# Patient Record
Sex: FEMALE | Race: BLACK OR AFRICAN AMERICAN | NOT HISPANIC OR LATINO | Employment: FULL TIME | RURAL
[De-identification: names, ages, dates, MRNs, and addresses within clinical notes are randomized per-mention and may not be internally consistent; named-entity substitution may affect disease eponyms.]

---
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
## 2021-12-27 ENCOUNTER — HOSPITAL ENCOUNTER (EMERGENCY)
Age: 21
Discharge: HOME | End: 2021-12-27
Payer: COMMERCIAL

## 2021-12-27 VITALS
DIASTOLIC BLOOD PRESSURE: 87 MMHG | TEMPERATURE: 99.14 F | OXYGEN SATURATION: 100 % | SYSTOLIC BLOOD PRESSURE: 140 MMHG | HEART RATE: 97 BPM | RESPIRATION RATE: 21 BRPM

## 2021-12-27 VITALS
RESPIRATION RATE: 21 BRPM | OXYGEN SATURATION: 100 % | HEART RATE: 97 BPM | TEMPERATURE: 99.14 F | DIASTOLIC BLOOD PRESSURE: 87 MMHG | SYSTOLIC BLOOD PRESSURE: 140 MMHG

## 2021-12-27 VITALS — BODY MASS INDEX: 19.6 KG/M2

## 2021-12-27 DIAGNOSIS — U07.1: Primary | ICD-10-CM

## 2021-12-27 PROCEDURE — C9803 HOPD COVID-19 SPEC COLLECT: HCPCS

## 2021-12-27 PROCEDURE — U0003 INFECTIOUS AGENT DETECTION BY NUCLEIC ACID (DNA OR RNA); SEVERE ACUTE RESPIRATORY SYNDROME CORONAVIRUS 2 (SARS-COV-2) (CORONAVIRUS DISEASE [COVID-19]), AMPLIFIED PROBE TECHNIQUE, MAKING USE OF HIGH THROUGHPUT TECHNOLOGIES AS DESCRIBED BY CMS-2020-01-R: HCPCS

## 2021-12-27 PROCEDURE — 99203 OFFICE O/P NEW LOW 30 MIN: CPT

## 2021-12-27 PROCEDURE — U0005 INFEC AGEN DETEC AMPLI PROBE: HCPCS

## 2021-12-27 PROCEDURE — 87804 INFLUENZA ASSAY W/OPTIC: CPT

## 2021-12-27 PROCEDURE — G0463 HOSPITAL OUTPT CLINIC VISIT: HCPCS

## 2021-12-27 PROCEDURE — 87880 STREP A ASSAY W/OPTIC: CPT

## 2022-10-04 ENCOUNTER — HOSPITAL ENCOUNTER (EMERGENCY)
Age: 22
Discharge: HOME | End: 2022-10-04
Payer: COMMERCIAL

## 2022-10-04 VITALS
HEART RATE: 74 BPM | TEMPERATURE: 98.42 F | RESPIRATION RATE: 16 BRPM | OXYGEN SATURATION: 99 % | SYSTOLIC BLOOD PRESSURE: 147 MMHG | DIASTOLIC BLOOD PRESSURE: 74 MMHG

## 2022-10-04 VITALS
HEART RATE: 74 BPM | SYSTOLIC BLOOD PRESSURE: 147 MMHG | RESPIRATION RATE: 16 BRPM | TEMPERATURE: 98.5 F | DIASTOLIC BLOOD PRESSURE: 74 MMHG

## 2022-10-04 VITALS — BODY MASS INDEX: 21.9 KG/M2

## 2022-10-04 DIAGNOSIS — J02.0: Primary | ICD-10-CM

## 2022-10-04 PROCEDURE — 99212 OFFICE O/P EST SF 10 MIN: CPT

## 2022-10-04 PROCEDURE — G0463 HOSPITAL OUTPT CLINIC VISIT: HCPCS

## 2022-10-04 PROCEDURE — 87880 STREP A ASSAY W/OPTIC: CPT

## 2022-12-07 ENCOUNTER — HOSPITAL ENCOUNTER (EMERGENCY)
Age: 22
Discharge: HOME | End: 2022-12-07
Payer: COMMERCIAL

## 2022-12-07 VITALS
RESPIRATION RATE: 18 BRPM | OXYGEN SATURATION: 100 % | SYSTOLIC BLOOD PRESSURE: 124 MMHG | TEMPERATURE: 97.9 F | HEART RATE: 96 BPM | DIASTOLIC BLOOD PRESSURE: 76 MMHG

## 2022-12-07 VITALS
SYSTOLIC BLOOD PRESSURE: 124 MMHG | OXYGEN SATURATION: 100 % | TEMPERATURE: 97.9 F | RESPIRATION RATE: 17 BRPM | DIASTOLIC BLOOD PRESSURE: 76 MMHG | HEART RATE: 96 BPM

## 2022-12-07 VITALS — BODY MASS INDEX: 21.9 KG/M2

## 2022-12-07 DIAGNOSIS — J06.9: Primary | ICD-10-CM

## 2022-12-07 PROCEDURE — 87880 STREP A ASSAY W/OPTIC: CPT

## 2022-12-07 PROCEDURE — G0463 HOSPITAL OUTPT CLINIC VISIT: HCPCS

## 2022-12-07 PROCEDURE — 99212 OFFICE O/P EST SF 10 MIN: CPT

## 2023-02-07 ENCOUNTER — OFFICE VISIT (OUTPATIENT)
Dept: OBSTETRICS AND GYNECOLOGY | Facility: CLINIC | Age: 23
End: 2023-02-07
Payer: COMMERCIAL

## 2023-02-07 VITALS
HEIGHT: 67 IN | WEIGHT: 158 LBS | SYSTOLIC BLOOD PRESSURE: 122 MMHG | DIASTOLIC BLOOD PRESSURE: 82 MMHG | BODY MASS INDEX: 24.8 KG/M2

## 2023-02-07 DIAGNOSIS — O02.0 BLIGHTED OVUM: Primary | ICD-10-CM

## 2023-02-07 PROBLEM — Z34.00 PREGNANCY, FIRST: Status: ACTIVE | Noted: 2023-02-07

## 2023-02-07 PROCEDURE — 99203 OFFICE O/P NEW LOW 30 MIN: CPT | Performed by: OBSTETRICS & GYNECOLOGY

## 2023-02-07 NOTE — PROGRESS NOTES
"    Chief Complaint   Patient presents with   • Threatened Miscarriage          HPI  Elaina Pena is a 22 y.o. female, , who presents with cramping and U/S with gestational sac only.    Recent Tests:  She had a positive urine pregnancy test on 2023. She did take a few preg test on 23 and 23.     US today: Yes.  Findings showed gestational sac measuring 5 weeks 5 days.  Yolk sac seen but fetal pole missing..  I have personally evaluated the U/S and agree with the findings.   She has not had prenatal care.  She complains of cramping pain.  The pain is located in her suprapubic region.. Her past medical history is non-contributory.  She does not have passage of tissue.  Rh Status: Unknown  She also complains of nausea and back pain.    The additional following portions of the patient's history were reviewed and updated as appropriate: allergies, current medications, past family history, past medical history, past social history, past surgical history and problem list.    Review of Systems   Constitutional: Negative.    HENT: Negative.    Eyes: Negative.    Respiratory: Negative.    Cardiovascular: Negative.    Gastrointestinal: Negative.    Endocrine: Negative.    Genitourinary: Negative.    Musculoskeletal: Negative.    Skin: Negative.    Allergic/Immunologic: Negative.    Neurological: Negative.    Hematological: Negative.    Psychiatric/Behavioral: Negative.      All other systems reviewed and are negative.     I have reviewed and agree with the HPI, ROS, and historical information as entered above. Anita Flanagan MD    Objective   /82   Ht 168.9 cm (66.5\")   Wt 71.7 kg (158 lb)   LMP 2022 (Approximate)   BMI 25.12 kg/m²     Physical Exam  Vitals and nursing note reviewed.   Constitutional:       Appearance: Normal appearance. She is well-developed.   HENT:      Head: Normocephalic and atraumatic.   Pulmonary:      Effort: Pulmonary effort is normal.      Breath sounds: " Normal breath sounds.   Abdominal:      General: There is no distension.      Palpations: Abdomen is soft. Abdomen is not rigid. There is no mass.      Tenderness: There is no abdominal tenderness. There is no guarding or rebound.   Musculoskeletal:      Cervical back: Normal range of motion.   Skin:     General: Skin is warm and dry.   Neurological:      Mental Status: She is alert and oriented to person, place, and time.   Psychiatric:         Mood and Affect: Mood normal.         Behavior: Behavior normal.            Assessment and Plan    Problem List Items Addressed This Visit        Gravid and     Blighted ovum - Primary    Overview     2023-ultrasound shows gestational sac with yolk sac only measuring 5 weeks 5 days.  Unsure if this is early gestation or blighted ovum.  Plan to draw labs today.  Repeat hCG in 2 days.  Have patient return to clinic in 2 weeks for an ultrasound to assess viability.         Relevant Orders    US Ob Transvaginal    ABO / Rh    HCG, B-subunit, Quantitative       1. Early gestation versus blighted ovum.  2. Repeat U/S in 2 week(s).  3. Call for an increase in bleeding, abdominal pain, or fever.  Return in about 2 weeks (around 2023) for ultrasound.    Counseling was given to patient for the following topics: diagnostic results, instructions for management, prognosis and impressions . Total time of the encounter was 25 minutes and 20 minutes was spend counseling.      Anita Flanagan MD  2023

## 2023-02-08 ENCOUNTER — TELEPHONE (OUTPATIENT)
Dept: OBSTETRICS AND GYNECOLOGY | Facility: CLINIC | Age: 23
End: 2023-02-08
Payer: COMMERCIAL

## 2023-02-08 LAB
ABO GROUP BLD: NORMAL
HCG INTACT+B SERPL-ACNC: NORMAL MIU/ML
RH BLD: POSITIVE

## 2023-02-08 NOTE — TELEPHONE ENCOUNTER
----- Message from Anita Flanagan MD sent at 2/8/2023  7:39 AM EST -----  Please help patient sign up for my chart.  Let her know that her blood type is O+.  Her hCG level looks great and we will repeat it tomorrow as planned.

## 2023-02-09 ENCOUNTER — LAB (OUTPATIENT)
Dept: OBSTETRICS AND GYNECOLOGY | Facility: CLINIC | Age: 23
End: 2023-02-09
Payer: COMMERCIAL

## 2023-02-09 DIAGNOSIS — O20.9 FIRST TRIMESTER BLEEDING: Primary | ICD-10-CM

## 2023-02-10 LAB — HCG INTACT+B SERPL-ACNC: NORMAL MIU/ML

## 2023-02-27 ENCOUNTER — INITIAL PRENATAL (OUTPATIENT)
Dept: OBSTETRICS AND GYNECOLOGY | Facility: CLINIC | Age: 23
End: 2023-02-27
Payer: COMMERCIAL

## 2023-02-27 VITALS — BODY MASS INDEX: 24.74 KG/M2 | DIASTOLIC BLOOD PRESSURE: 78 MMHG | WEIGHT: 155.6 LBS | SYSTOLIC BLOOD PRESSURE: 116 MMHG

## 2023-02-27 DIAGNOSIS — Z34.90 PRENATAL CARE, ANTEPARTUM: Primary | ICD-10-CM

## 2023-02-27 PROBLEM — Z34.00 PREGNANCY, FIRST: Status: RESOLVED | Noted: 2023-02-07 | Resolved: 2023-02-27

## 2023-02-27 PROBLEM — O02.0 BLIGHTED OVUM: Status: RESOLVED | Noted: 2023-02-07 | Resolved: 2023-02-27

## 2023-02-27 PROCEDURE — 0501F PRENATAL FLOW SHEET: CPT | Performed by: OBSTETRICS & GYNECOLOGY

## 2023-02-27 NOTE — PROGRESS NOTES
Initial ob visit     CC- Here for care of pregnancy        Elaina Pena is a 22 y.o. female, , who presents for her first obstetrical visit.  Her last LMP was Patient's last menstrual period was 2022.. Her HORACIO is 10/7/2023, by Ultrasound. Current GA is 8w2d.     Initial positive test date : 23, UPT        Her periods are: every 4 weeks  Prior obstetric issues: none  Patient's past medical history is significant for: none.  Family history of genetic issues (includes FOB): none  Prior infections concerning in pregnancy (Rash, fever in last 2 weeks): No  Varicella Hx - vaccinated  Prior testing for Cystic Fibrosis Carrier or Sickle Cell Trait- none  Prepregnancy BMI - Body mass index is 24.74 kg/m².  History of STD: no  Hx of HSV for patient or partner: no  US done today: Yes.  Findings showed single intrauterine pregnancy.  Measuring 8 weeks 2 days today with fetal heart rate of 167..  I have personally evaluated the U/S and agree with the findings.     OB History    Para Term  AB Living   1             SAB IAB Ectopic Molar Multiple Live Births                    # Outcome Date GA Lbr Arvind/2nd Weight Sex Delivery Anes PTL Lv   1 Current                Additional Pertinent History   Last Pap : never      Last Completed Pap Smear     This patient has no relevant Health Maintenance data.        History of abnormal Pap smear: no  Family history of uterine, colon, breast, or ovarian cancer: no  Feelings of Anxiety or Depression: no  Tobacco Usage?: Yes Elaina Pena  reports that she has never smoked. She states she vapes nicotine everyday. I have educated her on the risk of diseases from using tobacco products such as cancer, COPD, heart disease, reproductive problems and low birth weight.     I advised her to quit and she is willing to quit. We have discussed the following method/s for tobacco cessation:  Education Material Counseling.  Together we have set a quit date for 1 month from  today.  She will follow up with me in 5 weeks or sooner to check on her progress.    I spent 5 minutes counseling the patient.        Alcohol/Drug Use?: YES Drug: occasional/rare use  Over the age of 35 at delivery: no  Desires Genetic Screening: undecided  Flu Status: Already given in current flu season    PMH  No current outpatient medications on file.     History reviewed. No pertinent past medical history.     Past Surgical History:   Procedure Laterality Date   • WISDOM TOOTH EXTRACTION         Review of Systems   Review of Systems  Patient Reports: Nausea and vomiting and Fatigue  Patient Denies: Spotting, Heavy bleeding and Cramping  All systems reviewed and otherwise normal.    I have reviewed and agree with the HPI, ROS, and historical information as entered above. Anita Flanagan MD    /78   Wt 70.6 kg (155 lb 9.6 oz)   LMP 12/28/2022   BMI 24.74 kg/m²     The additional following portions of the patient's history were reviewed and updated as appropriate: allergies, current medications, past family history, past medical history, past social history, past surgical history and problem list.    Physical Exam  General:  well developed; well nourished  no acute distress   Chest/Respiratory: No labored breathing, normal respiratory effort, normal appearance, no respiratory noises noted   Heart:  normal rate, regular rhythm,  no murmurs, rubs, or gallops   Thyroid: normal to inspection and palpation   Breasts:  Not performed.   Abdomen: soft, non-tender; no masses  no umbilical or inguinal hernias are present  no hepato-splenomegaly   Pelvis: Exam limited by  body habitus  Clinical staff was present for exam  External genitalia:  normal appearance of the external genitalia including Bartholin's and [place]'s glands.  :  urethral meatus normal;  Vaginal:  normal pink mucosa without prolapse or lesions.  Cervix:  normal appearance.  Uterus:  normal size, shape and consistency.  Adnexa:  normal  bimanual exam of the adnexa.        Assessment and Plan    Problem List Items Addressed This Visit        Gravid and     Prenatal care, antepartum - Primary    Relevant Orders    Obstetric Panel    Chlamydia trachomatis, Neisseria gonorrhoeae, PCR w/ confirmation - Urine, Urine, Clean Catch    Urinalysis With Microscopic - Urine, Clean Catch    HIV-1 / O / 2 Ag / Antibody 4th Generation    Urine Culture - Urine, Urine, Clean Catch    Urine Drug Screen - Urine, Clean Catch    LIQUID-BASED PAP SMEAR, [facility] ([facility])       1. Pregnancy at 8w2d  2. Reviewed routine prenatal care with the office and educational materials given  3. Lab(s) Ordered  4. Discussed options for genetic testing including first trimester nuchal translucency screen, genetic disease carrier testing, quadruple screen, and NIPT  5. Discontinue the use of all non-medicinal drugs and chemicals  6. Nausea/Vomiting - she does not desire medications at this time.  Discussed conservative ways to help with nausea.  7. Patient is on Prenatal vitamins  Return in about 4 weeks (around 3/27/2023).      Anita Flanagan MD  2023

## 2023-03-01 PROBLEM — F12.10 MILD TETRAHYDROCANNABINOL (THC) ABUSE: Status: ACTIVE | Noted: 2023-03-01

## 2023-03-01 LAB
ABO GROUP BLD: NORMAL
AMPHETAMINES UR QL SCN: NEGATIVE NG/ML
APPEARANCE UR: CLEAR
BACTERIA #/AREA URNS HPF: NORMAL /[HPF]
BACTERIA UR CULT: NORMAL
BACTERIA UR CULT: NORMAL
BARBITURATES UR QL SCN: NEGATIVE NG/ML
BASOPHILS # BLD AUTO: 0 X10E3/UL (ref 0–0.2)
BASOPHILS NFR BLD AUTO: 0 %
BENZODIAZ UR QL SCN: NEGATIVE NG/ML
BILIRUB UR QL STRIP: NEGATIVE
BLD GP AB SCN SERPL QL: NEGATIVE
BZE UR QL SCN: NEGATIVE NG/ML
C TRACH RRNA SPEC QL NAA+PROBE: NEGATIVE
CANNABINOIDS UR QL SCN: POSITIVE NG/ML
CASTS URNS QL MICRO: NORMAL /LPF
COLOR UR: YELLOW
CREAT UR-MCNC: 221.3 MG/DL (ref 20–300)
EOSINOPHIL # BLD AUTO: 0.1 X10E3/UL (ref 0–0.4)
EOSINOPHIL NFR BLD AUTO: 1 %
EPI CELLS #/AREA URNS HPF: NORMAL /HPF (ref 0–10)
ERYTHROCYTE [DISTWIDTH] IN BLOOD BY AUTOMATED COUNT: 12 % (ref 11.7–15.4)
GLUCOSE UR QL STRIP: NEGATIVE
HBV SURFACE AG SERPL QL IA: NEGATIVE
HCT VFR BLD AUTO: 39.9 % (ref 34–46.6)
HCV IGG SERPL QL IA: NON REACTIVE
HGB BLD-MCNC: 13.2 G/DL (ref 11.1–15.9)
HGB UR QL STRIP: NEGATIVE
HIV 1+2 AB+HIV1 P24 AG SERPL QL IA: NON REACTIVE
IMM GRANULOCYTES # BLD AUTO: 0 X10E3/UL (ref 0–0.1)
IMM GRANULOCYTES NFR BLD AUTO: 0 %
KETONES UR QL STRIP: NEGATIVE
LABORATORY COMMENT REPORT: ABNORMAL
LEUKOCYTE ESTERASE UR QL STRIP: NEGATIVE
LYMPHOCYTES # BLD AUTO: 2.4 X10E3/UL (ref 0.7–3.1)
LYMPHOCYTES NFR BLD AUTO: 26 %
Lab: NORMAL
MCH RBC QN AUTO: 27.2 PG (ref 26.6–33)
MCHC RBC AUTO-ENTMCNC: 33.1 G/DL (ref 31.5–35.7)
MCV RBC AUTO: 82 FL (ref 79–97)
METHADONE UR QL SCN: NEGATIVE NG/ML
MICRO URNS: NORMAL
MICRO URNS: NORMAL
MONOCYTES # BLD AUTO: 0.8 X10E3/UL (ref 0.1–0.9)
MONOCYTES NFR BLD AUTO: 9 %
N GONORRHOEA RRNA SPEC QL NAA+PROBE: NEGATIVE
NEUTROPHILS # BLD AUTO: 5.8 X10E3/UL (ref 1.4–7)
NEUTROPHILS NFR BLD AUTO: 64 %
NITRITE UR QL STRIP: NEGATIVE
OPIATES UR QL SCN: NEGATIVE NG/ML
OXYCODONE+OXYMORPHONE UR QL SCN: NEGATIVE NG/ML
PCP UR QL: NEGATIVE NG/ML
PH UR STRIP: 6 [PH] (ref 5–7.5)
PH UR: 5.8 [PH] (ref 4.5–8.9)
PLATELET # BLD AUTO: 228 X10E3/UL (ref 150–450)
PROPOXYPH UR QL SCN: NEGATIVE NG/ML
PROT UR QL STRIP: NEGATIVE
RBC # BLD AUTO: 4.85 X10E6/UL (ref 3.77–5.28)
RBC #/AREA URNS HPF: NORMAL /HPF (ref 0–2)
RH BLD: POSITIVE
RPR SER QL: NON REACTIVE
RUBV IGG SERPL IA-ACNC: 3.07 INDEX
SP GR UR STRIP: 1.02 (ref 1–1.03)
UROBILINOGEN UR STRIP-MCNC: 1 MG/DL (ref 0.2–1)
WBC # BLD AUTO: 9.2 X10E3/UL (ref 3.4–10.8)
WBC #/AREA URNS HPF: NORMAL /HPF (ref 0–5)

## 2023-03-03 LAB — REF LAB TEST METHOD: NORMAL

## 2023-03-05 PROBLEM — Z87.42 HISTORY OF ABNORMAL CERVICAL PAP SMEAR: Status: ACTIVE | Noted: 2023-03-05

## 2023-03-27 ENCOUNTER — ROUTINE PRENATAL (OUTPATIENT)
Dept: OBSTETRICS AND GYNECOLOGY | Facility: CLINIC | Age: 23
End: 2023-03-27
Payer: COMMERCIAL

## 2023-03-27 VITALS — WEIGHT: 152.9 LBS | DIASTOLIC BLOOD PRESSURE: 70 MMHG | BODY MASS INDEX: 24.31 KG/M2 | SYSTOLIC BLOOD PRESSURE: 128 MMHG

## 2023-03-27 DIAGNOSIS — Z34.90 PRENATAL CARE, ANTEPARTUM: Primary | ICD-10-CM

## 2023-03-27 DIAGNOSIS — F12.10 MILD TETRAHYDROCANNABINOL (THC) ABUSE: ICD-10-CM

## 2023-03-27 LAB
GLUCOSE UR STRIP-MCNC: NEGATIVE MG/DL
PROT UR STRIP-MCNC: NEGATIVE MG/DL

## 2023-03-27 RX ORDER — PRENATAL VIT NO.126/IRON/FOLIC 28MG-0.8MG
TABLET ORAL DAILY
COMMUNITY

## 2023-03-27 RX ORDER — DIPHENHYDRAMINE HYDROCHLORIDE 25 MG/1
25 CAPSULE ORAL DAILY
COMMUNITY

## 2023-03-27 NOTE — PROGRESS NOTES
OB FOLLOW UP  CC- Here for care of pregnancy        Elaina Pena is a 22 y.o.  12w2d patient being seen today for her obstetrical follow up visit. Patient reports no complaints..     Her prenatal care is complicated by (and status) :   Patient Active Problem List   Diagnosis   • Prenatal care, antepartum   • Mild tetrahydrocannabinol (THC) abuse   • History of abnormal cervical Pap smear       Desires genetic testing?: Yes with Gender  Ultrasound Today: No    ROS -   Patient Reports : No Problems  Patient Denies: Loss of Fluid, Vaginal Spotting, Vision Changes, Headaches, Nausea  and Vomiting   Fetal Movement : absent  All other systems reviewed and are negative.     The additional following portions of the patient's history were reviewed and updated as appropriate: allergies, current medications, past family history, past medical history, past social history, past surgical history and problem list.    I have reviewed and agree with the HPI, ROS, and historical information as entered above. Anita Flanagan MD    /70   Wt 69.4 kg (152 lb 14.4 oz)   LMP 2022   BMI 24.31 kg/m²         EXAM:     Prenatal Vitals  BP: 128/70  Weight: 69.4 kg (152 lb 14.4 oz)   Fetal Heart Rate: 163          Urine Glucose Read-only: Negative  Urine Protein Read-only: Negative       Assessment and Plan    Problem List Items Addressed This Visit        Gravid and     Prenatal care, antepartum - Primary    Relevant Orders    POC Urinalysis Dipstick (Completed)    [id number] PLUS Core+SCA+ESS - Blood,       Mental Health    Mild tetrahydrocannabinol (THC) abuse    Overview     Positive at new OB visit on 2023-recommend repeat at 20 weeks.            1. Pregnancy at 12w2d  2. Labs reviewed from New OB Visit.  3. Counseled on genetic testing, carrier status and option for NT screen  4. Activity and Exercise discussed.  5. Patient is on Prenatal vitamins  6. Genetic testing today.  Return in about  4 weeks (around 4/24/2023).    Anita Flanagan MD  03/27/2023

## 2023-04-27 ENCOUNTER — ROUTINE PRENATAL (OUTPATIENT)
Dept: OBSTETRICS AND GYNECOLOGY | Facility: CLINIC | Age: 23
End: 2023-04-27
Payer: COMMERCIAL

## 2023-04-27 VITALS — WEIGHT: 158.2 LBS | SYSTOLIC BLOOD PRESSURE: 112 MMHG | DIASTOLIC BLOOD PRESSURE: 70 MMHG | BODY MASS INDEX: 25.15 KG/M2

## 2023-04-27 DIAGNOSIS — F12.10 MILD TETRAHYDROCANNABINOL (THC) ABUSE: ICD-10-CM

## 2023-04-27 DIAGNOSIS — Z34.90 PRENATAL CARE, ANTEPARTUM: Primary | ICD-10-CM

## 2023-04-27 LAB
GLUCOSE UR STRIP-MCNC: NEGATIVE MG/DL
PROT UR STRIP-MCNC: NEGATIVE MG/DL

## 2023-04-27 NOTE — PROGRESS NOTES
OB FOLLOW UP  CC- Here for care of pregnancy        Elaina Pena is a 22 y.o.  16w5d patient being seen today for her obstetrical follow up visit. Patient reports nausea and vomiting 4-5 times a week. Pt states unisom and vitamin b6 does help.     Her prenatal care is complicated by (and status) :   Patient Active Problem List   Diagnosis   • Prenatal care, antepartum   • Mild tetrahydrocannabinol (THC) abuse   • History of abnormal cervical Pap smear         Ultrasound Today: No    AFP: declines    ROS -   Patient Denies: Loss of Fluid, Vaginal Spotting, Vision Changes, Headaches, Contractions and Epigastric pain  Fetal Movement : absent  All other systems reviewed and are negative.       The additional following portions of the patient's history were reviewed and updated as appropriate: allergies and current medications.    I have reviewed and agree with the HPI, ROS, and historical information as entered above. Anita Flanagan MD        EXAM:     Prenatal Vitals  BP: 112/70  Weight: 71.8 kg (158 lb 3.2 oz)   Fetal Heart Rate: 150   Pelvic Exam:        Urine Glucose Read-only: Negative  Urine Protein Read-only: Negative           Assessment and Plan    Problem List Items Addressed This Visit        Gravid and     Prenatal care, antepartum - Primary    Overview     cfDNA negative.  Boy!         Relevant Orders    POC Urinalysis Dipstick (Completed)    US Ob 14 + Weeks Single or First Gestation       Mental Health    Mild tetrahydrocannabinol (THC) abuse    Overview     Positive at new OB visit on 2023-recommend repeat at 20 weeks.            1. Pregnancy at 16w5d  2. Fetal status reassuring.   3. Counseled on MSAFP alone in relation to OTD and placental issues.  Patient declines testing  4. Anatomy scan next visit.   5. Activity and Exercise discussed.  6. Patient is on Prenatal vitamins  Return in about 4 weeks (around 2023) for anatomy usg.    Anita Flanagan  MD  04/27/2023

## 2023-05-22 ENCOUNTER — ROUTINE PRENATAL (OUTPATIENT)
Dept: OBSTETRICS AND GYNECOLOGY | Facility: CLINIC | Age: 23
End: 2023-05-22
Payer: COMMERCIAL

## 2023-05-22 VITALS — BODY MASS INDEX: 26.01 KG/M2 | DIASTOLIC BLOOD PRESSURE: 70 MMHG | WEIGHT: 163.6 LBS | SYSTOLIC BLOOD PRESSURE: 128 MMHG

## 2023-05-22 DIAGNOSIS — Z34.90 PRENATAL CARE, ANTEPARTUM: Primary | ICD-10-CM

## 2023-05-22 DIAGNOSIS — F12.10 MILD TETRAHYDROCANNABINOL (THC) ABUSE: ICD-10-CM

## 2023-05-22 LAB
EXPIRATION DATE: 0
GLUCOSE UR STRIP-MCNC: ABNORMAL MG/DL
Lab: 0
PROT UR STRIP-MCNC: NEGATIVE MG/DL

## 2023-05-22 NOTE — PROGRESS NOTES
OB FOLLOW UP  CC- Here for care of pregnancy        Elaina Pena is a 22 y.o.  20w2d patient being seen today for her obstetrical follow up visit. Patient reports vomiting yesterday and the day before after eating some fish. No other complaints. Patient states she had a Big Red and very sweet coffee this AM.    Her prenatal care is complicated by (and status):  Patient Active Problem List   Diagnosis   • Prenatal care, antepartum   • Mild tetrahydrocannabinol (THC) abuse   • History of abnormal cervical Pap smear     US done today: Yes.  Findings showed Male fetus in cephalic presentation fetal heart rate of 155.  Placenta is posterior and fundal.  Umbilical cord is three-vessel and normal fluid volume noted.  Estimated fetal weight 13 ounces with normal growth.  Suboptimal for visualization of cranial anatomy secondary to positioning.  Heart seen today and appears normal.  No fetal anomaly seen.  Cervical length 40 mm..  I have personally evaluated the U/S and agree with the findings. Anita Flanagan MD      ROS -   Patient Reports : see above  Patient Denies: Loss of Fluid, Vaginal Spotting, Vision Changes, Headaches, Contractions and Epigastric pain  Fetal Movement : normal  All other systems reviewed and are negative.       The additional following portions of the patient's history were reviewed and updated as appropriate: allergies, current medications, past family history, past medical history, past social history, past surgical history and problem list.    I have reviewed and agree with the HPI, ROS, and historical information as entered above. Anita Flanagan MD    /70   Wt 74.2 kg (163 lb 9.6 oz)   LMP 2022   BMI 26.01 kg/m²       EXAM:     Prenatal Vitals  BP: 128/70  Weight: 74.2 kg (163 lb 9.6 oz)   Fetal Heart Rate: 155          Urine Glucose Read-only: (!) 1+  Urine Protein Read-only: Negative       Assessment and Plan    Problem List Items Addressed This Visit         Gravid and     Prenatal care, antepartum - Primary    Overview     cfDNA negative.  Boy!         Relevant Orders    Urine Drug Screen - Urine, Clean Catch    POC Glucose, Urine, Qualitative, Dipstick (Completed)    POC Protein, Urine, Qualitative, Dipstick (Completed)    US Ob Follow Up Transabdominal Approach       Mental Health    Mild tetrahydrocannabinol (THC) abuse    Overview     Positive at new OB visit on 2023-recommend repeat at 20 weeks.         Relevant Orders    Urine Drug Screen - Urine, Clean Catch       1. Pregnancy at 20w2d  2. Discussed glucosuria need to watch carbohydrate control.  3. Anatomy scan today is incomplete, follow up in 4 weeks for additional views. Anatomy that was visualized was within normal limits.  4. Fetal status reassuring.   5. Activity and Exercise discussed.  6. Patient is on Prenatal vitamins  Return in about 4 weeks (around 2023) for ultrasound.    Anita Flanagan MD  2023

## 2023-05-23 LAB
AMPHETAMINES UR QL SCN: NEGATIVE NG/ML
BARBITURATES UR QL SCN: NEGATIVE NG/ML
BENZODIAZ UR QL SCN: NEGATIVE NG/ML
BZE UR QL SCN: NEGATIVE NG/ML
CANNABINOIDS UR QL SCN: POSITIVE NG/ML
CREAT UR-MCNC: 210.1 MG/DL (ref 20–300)
LABORATORY COMMENT REPORT: ABNORMAL
METHADONE UR QL SCN: NEGATIVE NG/ML
OPIATES UR QL SCN: NEGATIVE NG/ML
OXYCODONE+OXYMORPHONE UR QL SCN: NEGATIVE NG/ML
PCP UR QL: NEGATIVE NG/ML
PH UR: 5.5 [PH] (ref 4.5–8.9)
PROPOXYPH UR QL SCN: NEGATIVE NG/ML

## 2023-06-19 ENCOUNTER — ROUTINE PRENATAL (OUTPATIENT)
Dept: OBSTETRICS AND GYNECOLOGY | Facility: CLINIC | Age: 23
End: 2023-06-19
Payer: COMMERCIAL

## 2023-06-19 VITALS — BODY MASS INDEX: 27.63 KG/M2 | SYSTOLIC BLOOD PRESSURE: 112 MMHG | DIASTOLIC BLOOD PRESSURE: 70 MMHG | WEIGHT: 173.8 LBS

## 2023-06-19 DIAGNOSIS — O36.5930 POOR FETAL GROWTH AFFECTING MANAGEMENT OF MOTHER IN THIRD TRIMESTER, SINGLE OR UNSPECIFIED FETUS: ICD-10-CM

## 2023-06-19 DIAGNOSIS — F12.10 MILD TETRAHYDROCANNABINOL (THC) ABUSE: ICD-10-CM

## 2023-06-19 DIAGNOSIS — Z34.90 PRENATAL CARE, ANTEPARTUM: Primary | ICD-10-CM

## 2023-06-19 DIAGNOSIS — O35.09X0 CEREBRAL VENTRICULOMEGALY, FETAL, AFFECTING CARE OF MOTHER: ICD-10-CM

## 2023-06-19 LAB
GLUCOSE UR STRIP-MCNC: NEGATIVE MG/DL
PROT UR STRIP-MCNC: NEGATIVE MG/DL

## 2023-06-19 PROCEDURE — 0502F SUBSEQUENT PRENATAL CARE: CPT | Performed by: NURSE PRACTITIONER

## 2023-06-19 NOTE — PROGRESS NOTES
OB FOLLOW UP  CC- Here for care of pregnancy        Elaina Pena is a 22 y.o.  24w2d patient being seen today for her obstetrical follow up visit. Patient reports no complaints.     Her prenatal care is complicated by (and status) :   Patient Active Problem List   Diagnosis    Prenatal care, antepartum    Mild tetrahydrocannabinol (THC) abuse    History of abnormal cervical Pap smear    Cerebral ventriculomegaly, fetal, affecting care of mother    Poor fetal growth affecting management of mother in third trimester       US done today: Yes for additional anatomy views of brain, spine, nose/lips.  Findings showed right lateral ventriculomegaly approx 11 mm. All other structures visualized appear normal. EFW 28%, AC 4%, HC 9%. Breech, Normal AF.  I have reviewed the preliminary US report. Final report pending physician review. . YAN Sanabria     ROS -   Patient Reports : No Problems  Patient Denies: Loss of Fluid, Vaginal Spotting, Vision Changes, Headaches, Nausea , Vomiting , Contractions, and Epigastric pain  Fetal Movement : normal  All other systems reviewed and are negative.       The additional following portions of the patient's history were reviewed and updated as appropriate: allergies, current medications, past family history, past medical history, past social history, past surgical history, and problem list.      I have reviewed and agree with the HPI, ROS, and historical information as entered above. YAN Sanabria      /70   Wt 78.8 kg (173 lb 12.8 oz)   LMP 2022   BMI 27.63 kg/m²       EXAM:     Prenatal Vitals  BP: 112/70  Weight: 78.8 kg (173 lb 12.8 oz)   Fetal Heart Rate: 149               Urine Glucose Read-only: Negative  Urine Protein Read-only: Negative       Assessment and Plan    Problem List Items Addressed This Visit       Prenatal care, antepartum - Primary    Overview     cfDNA negative.  Boy!         Relevant Orders    POC Urinalysis  Dipstick (Completed)    Mild tetrahydrocannabinol (THC) abuse    Overview     Positive at new OB visit on 2023  Positive on 23  Repeat at 28 week visit         Cerebral ventriculomegaly, fetal, affecting care of mother    Current Assessment & Plan     23-Right lateral ventriculomegaly 11 mm  CfDNA negative, No AFP done          Relevant Orders    [facility] [place]    Poor fetal growth affecting management of mother in third trimester    Overview     23-AC 4%, HC 9%, EFW 28%         Current Assessment & Plan     23-AC 4%, HC 9%, EFW 28%          Relevant Orders    [facility]       Pregnancy at 24w2d  Fetal status reassuring. Pt reports adequate fetal movement.  Anatomy scan repeated today for additional views. Right lateral ventriculomegaly approx 11 mm. AC 4%, HC 9%, EFW 28%.  1 hour gtt, CBC, Antibody screen, and TDAP next visit. Instructions given  Fetal movement/PTL or Labor precautions  Reviewed routine prenatal care with the office and educational materials given  Referral to PDC Ordered  Discussed/encouraged TDAP vaccination after 28 weeks  Reviewed UDS and risks during pregnancy; will repeat monthly  Activity and Exercise discussed.  Return in about 4 weeks (around 2023).  Dr. Benavides recommends FU within 1-2 weeks at PDC  Plan repeat UDS next visit    Sol Jackson, YAN  2023

## 2023-07-27 ENCOUNTER — OFFICE VISIT (OUTPATIENT)
Dept: OBSTETRICS AND GYNECOLOGY | Facility: HOSPITAL | Age: 23
End: 2023-07-27
Payer: COMMERCIAL

## 2023-07-27 ENCOUNTER — HOSPITAL ENCOUNTER (OUTPATIENT)
Dept: WOMENS IMAGING | Facility: HOSPITAL | Age: 23
Discharge: HOME | End: 2023-07-27
Admitting: OBSTETRICS & GYNECOLOGY
Payer: COMMERCIAL

## 2023-07-27 ENCOUNTER — ROUTINE PRENATAL (OUTPATIENT)
Dept: OBSTETRICS AND GYNECOLOGY | Facility: CLINIC | Age: 23
End: 2023-07-27
Payer: COMMERCIAL

## 2023-07-27 VITALS — DIASTOLIC BLOOD PRESSURE: 70 MMHG | BODY MASS INDEX: 28.78 KG/M2 | WEIGHT: 181 LBS | SYSTOLIC BLOOD PRESSURE: 114 MMHG

## 2023-07-27 VITALS — DIASTOLIC BLOOD PRESSURE: 76 MMHG | SYSTOLIC BLOOD PRESSURE: 124 MMHG | BODY MASS INDEX: 28.87 KG/M2 | WEIGHT: 181.6 LBS

## 2023-07-27 DIAGNOSIS — O99.019 MATERNAL ANEMIA IN PREGNANCY, ANTEPARTUM: ICD-10-CM

## 2023-07-27 DIAGNOSIS — O36.5930 POOR FETAL GROWTH AFFECTING MANAGEMENT OF MOTHER IN THIRD TRIMESTER, SINGLE OR UNSPECIFIED FETUS: ICD-10-CM

## 2023-07-27 DIAGNOSIS — O35.09X0 CEREBRAL VENTRICULOMEGALY, FETAL, AFFECTING CARE OF MOTHER: Primary | ICD-10-CM

## 2023-07-27 DIAGNOSIS — O35.09X0 CEREBRAL VENTRICULOMEGALY, FETAL, AFFECTING CARE OF MOTHER: ICD-10-CM

## 2023-07-27 DIAGNOSIS — Z34.90 PRENATAL CARE, ANTEPARTUM: Primary | ICD-10-CM

## 2023-07-27 DIAGNOSIS — F12.10 MILD TETRAHYDROCANNABINOL (THC) ABUSE: ICD-10-CM

## 2023-07-27 DIAGNOSIS — Z34.90 PREGNANCY, UNSPECIFIED GESTATIONAL AGE: ICD-10-CM

## 2023-07-27 LAB
GLUCOSE UR STRIP-MCNC: NEGATIVE MG/DL
PROT UR STRIP-MCNC: NEGATIVE MG/DL

## 2023-07-27 PROCEDURE — 76819 FETAL BIOPHYS PROFIL W/O NST: CPT

## 2023-07-27 PROCEDURE — 76816 OB US FOLLOW-UP PER FETUS: CPT

## 2023-07-27 PROCEDURE — 76820 UMBILICAL ARTERY ECHO: CPT

## 2023-07-27 NOTE — PROGRESS NOTES
OB FOLLOW UP  CC- Here for care of pregnancy        Elaina Pena is a 22 y.o.  29w5d patient being seen today for her obstetrical follow up visit. Patient reports no complaints..     Her prenatal care is complicated by (and status) :    Patient Active Problem List   Diagnosis    Prenatal care, antepartum    Mild tetrahydrocannabinol (THC) abuse    History of abnormal cervical Pap smear    Cerebral ventriculomegaly, fetal, affecting care of mother    Poor fetal growth affecting management of mother in third trimester    Maternal anemia in pregnancy, antepartum         TDAP status: received at last visit  Rhogam status: was not indicated  28 week labs: Reviewed and Labs show anemia. She is taking additional iron supplement.  Ultrasound Today: Yes at PDC. Report states mild but improved AC lag at 7th%, lateral ventricles appear wnl. Recommended growth and dopplers in 3 weeks. With continued AC lag recommended weekly  testing until next growth scan 23.    Non Stress Test: No. BPP at PDC       ROS -   Patient Reports : No Problems  Patient Denies: Loss of Fluid, Vaginal Spotting, Vision Changes, Headaches, and Contractions  Fetal Movement : normal  All other systems reviewed and are negative.       The additional following portions of the patient's history were reviewed and updated as appropriate: allergies, current medications, past family history, past medical history, past social history, past surgical history, and problem list.    I have reviewed and agree with the HPI, ROS, and historical information as entered above. Anita Flanagan MD      /70   Wt 82.1 kg (181 lb)   LMP 2022   BMI 28.78 kg/m²         EXAM:     Prenatal Vitals  BP: 114/70  Weight: 82.1 kg (181 lb)   Fetal Heart Rate: 159bpm               Urine Glucose Read-only: Negative  Urine Protein Read-only: Negative           Assessment and Plan    Problem List Items Addressed This Visit          Gravid and      Prenatal care, antepartum - Primary    Overview     cfDNA negative.  Boy!         Relevant Orders    POC Urinalysis Dipstick (Completed)    Cerebral ventriculomegaly, fetal, affecting care of mother    Overview     Seen by PDC at 26 weeks.  Reevaluation on  shows normal lateral ventricles          Poor fetal growth affecting management of mother in third trimester    Overview     23-AC 4%, HC 9%, EFW 28%  2023-PDC evaluation showed AC-F 3rd percentile and normal growth.     PDC: Mild but improved AC lag currently 7th%, lateral ventricles appear wnl. With continued AC lag recommended weekly  testing until next growth scan 23             Hematology and Neoplasia    Maternal anemia in pregnancy, antepartum    Overview     11.2            Mental Health    Mild tetrahydrocannabinol (THC) abuse    Overview     Positive at new OB visit on 2023  Positive on 23  Repeat at 28 week visit-positive            Pregnancy at 29w5d  Fetal status reassuring.  BPP 8 out of 8.  Ventriculomegaly resolved.  AC 7th percentile which is somewhat improved from 3rd percentile at 25 weeks.  Per PDC recommendation, will have weekly NSTs until growth ultrasound which is scheduled on .  28 week labs reviewed.    Activity and Exercise discussed.  Fetal movement/PTL or Labor precautions  Return in about 1 week (around 8/3/2023) for nst.    Anita Flanagan MD  2023

## 2023-08-02 ENCOUNTER — ROUTINE PRENATAL (OUTPATIENT)
Dept: OBSTETRICS AND GYNECOLOGY | Facility: CLINIC | Age: 23
End: 2023-08-02
Payer: COMMERCIAL

## 2023-08-02 VITALS — DIASTOLIC BLOOD PRESSURE: 78 MMHG | WEIGHT: 182.8 LBS | BODY MASS INDEX: 29.06 KG/M2 | SYSTOLIC BLOOD PRESSURE: 118 MMHG

## 2023-08-02 DIAGNOSIS — O36.5930 POOR FETAL GROWTH AFFECTING MANAGEMENT OF MOTHER IN THIRD TRIMESTER, SINGLE OR UNSPECIFIED FETUS: Primary | ICD-10-CM

## 2023-08-02 DIAGNOSIS — Z34.90 PRENATAL CARE, ANTEPARTUM: ICD-10-CM

## 2023-08-02 DIAGNOSIS — F12.10 MILD TETRAHYDROCANNABINOL (THC) ABUSE: ICD-10-CM

## 2023-08-02 DIAGNOSIS — O99.019 MATERNAL ANEMIA IN PREGNANCY, ANTEPARTUM: ICD-10-CM

## 2023-08-02 LAB
GLUCOSE UR STRIP-MCNC: NEGATIVE MG/DL
PROT UR STRIP-MCNC: NEGATIVE MG/DL

## 2023-08-10 ENCOUNTER — ROUTINE PRENATAL (OUTPATIENT)
Dept: OBSTETRICS AND GYNECOLOGY | Facility: CLINIC | Age: 23
End: 2023-08-10
Payer: COMMERCIAL

## 2023-08-10 VITALS — SYSTOLIC BLOOD PRESSURE: 122 MMHG | DIASTOLIC BLOOD PRESSURE: 76 MMHG | WEIGHT: 184.4 LBS | BODY MASS INDEX: 29.32 KG/M2

## 2023-08-10 DIAGNOSIS — F12.10 MILD TETRAHYDROCANNABINOL (THC) ABUSE: ICD-10-CM

## 2023-08-10 DIAGNOSIS — O36.5930 POOR FETAL GROWTH AFFECTING MANAGEMENT OF MOTHER IN THIRD TRIMESTER, SINGLE OR UNSPECIFIED FETUS: ICD-10-CM

## 2023-08-10 DIAGNOSIS — Z34.90 PRENATAL CARE, ANTEPARTUM: Primary | ICD-10-CM

## 2023-08-10 DIAGNOSIS — O99.019 MATERNAL ANEMIA IN PREGNANCY, ANTEPARTUM: ICD-10-CM

## 2023-08-10 LAB
GLUCOSE UR STRIP-MCNC: NEGATIVE MG/DL
PROT UR STRIP-MCNC: NEGATIVE MG/DL

## 2023-08-10 NOTE — PROGRESS NOTES
OB FOLLOW UP  CC- Here for care of pregnancy        Elaina Pena is a 23 y.o.  31w5d patient being seen today for her obstetrical follow up visit. Patient reports no complaints today.     Her prenatal care is complicated by (and status) :    Patient Active Problem List   Diagnosis    Prenatal care, antepartum    Mild tetrahydrocannabinol (THC) abuse    History of abnormal cervical Pap smear    Poor fetal growth affecting management of mother in third trimester    Maternal anemia in pregnancy, antepartum       TDAP status: declines  Rhogam status: was not indicated  28 week labs: Reviewed and Labs show anemia. She is taking additional iron supplement.  Ultrasound Today: No  Non Stress Test: Yes minutes 20- category 1  non-stress test: NST: Reactive  indication: Fetal Growth Restriction (IUGR)      ROS -   Patient Denies: Loss of Fluid, Vaginal Spotting, Vision Changes, Headaches, Nausea , Vomiting , Contractions, and Epigastric pain  Fetal Movement : normal  All other systems reviewed and are negative.       The additional following portions of the patient's history were reviewed and updated as appropriate: allergies, current medications, past family history, past medical history, past social history, past surgical history, and problem list.    I have reviewed and agree with the HPI, ROS, and historical information as entered above. Anita Flanagan MD      /76   Wt 83.6 kg (184 lb 6.4 oz)   LMP 2022   BMI 29.32 kg/mý         EXAM:     Prenatal Vitals  BP: 122/76  Weight: 83.6 kg (184 lb 6.4 oz)   Fetal Heart Rate: 135               Urine Glucose Read-only: Negative  Urine Protein Read-only: Negative           Assessment and Plan    Problem List Items Addressed This Visit          Gravid and     Prenatal care, antepartum - Primary    Overview     cfDNA negative.  Boy!         Relevant Orders    POC Urinalysis Dipstick (Completed)    Poor fetal growth affecting management of  mother in third trimester    Overview     23-AC 4%, HC 9%, EFW 28%  2023-PDC evaluation showed AC-F 3rd percentile and normal growth.     PDC: Mild but improved AC lag currently 7th%, lateral ventricles appear wnl. With continued AC lag recommended weekly  testing until next growth scan 23             Hematology and Neoplasia    Maternal anemia in pregnancy, antepartum    Overview     11.2         Relevant Medications    Ferrous Sulfate (IRON PO)       Mental Health    Mild tetrahydrocannabinol (THC) abuse    Overview     Positive at new OB visit on 2023  Positive on 23  Repeat at 28 week visit-positive            Pregnancy at 31w5d  Fetal status reassuring.  28 week labs reviewed.    Activity and Exercise discussed.  Fetal movement/PTL or Labor precautions  Return in about 1 week (around 2023) for nst.    Anita Flanagan MD  08/10/2023

## 2023-08-14 ENCOUNTER — TELEPHONE (OUTPATIENT)
Dept: OBSTETRICS AND GYNECOLOGY | Facility: CLINIC | Age: 23
End: 2023-08-14
Payer: COMMERCIAL

## 2023-08-14 ENCOUNTER — TELEPHONE (OUTPATIENT)
Dept: OBSTETRICS AND GYNECOLOGY | Facility: CLINIC | Age: 23
End: 2023-08-14

## 2023-08-14 NOTE — TELEPHONE ENCOUNTER
Caller: Elaina Pena    Relationship: Self    Best call back number: [phone]    What form or medical record are you requesting: LA PAPERWORK    Who is requesting this form or medical record from you: PT'S WORK    How would you like to receive the form or medical records (pick-up, mail, fax): FAX  If fax, what is the fax number: [phone]    Timeframe paperwork needed: ASAP    Additional notes: PT STATED THAT ALE CALLED AND SAID HER LA PAPERWORK WAS COMPLETED - PT WOULD LIKE TO HAVE THIS FAXED TO HER WORK AT THE ABOVE NUMBER

## 2023-08-14 NOTE — TELEPHONE ENCOUNTER
VISH COMPLETED AND SCANNED INTO CHART. CALLED PT TO INFORM HER IT WAS COMPLETED AND GET A NUMBER TO FAX IT TO. LVM FOR PT TO RETURN CALL IF SHE NEEDED IT FAXED SOMEWHERE OR SHE CAN PICK IT UP AT HER NEXT APPOINTMENT

## 2023-08-18 ENCOUNTER — OFFICE VISIT (OUTPATIENT)
Dept: OBSTETRICS AND GYNECOLOGY | Facility: HOSPITAL | Age: 23
End: 2023-08-18
Payer: COMMERCIAL

## 2023-08-18 ENCOUNTER — ROUTINE PRENATAL (OUTPATIENT)
Dept: OBSTETRICS AND GYNECOLOGY | Facility: CLINIC | Age: 23
End: 2023-08-18
Payer: COMMERCIAL

## 2023-08-18 ENCOUNTER — HOSPITAL ENCOUNTER (OUTPATIENT)
Dept: WOMENS IMAGING | Facility: HOSPITAL | Age: 23
Discharge: HOME | End: 2023-08-18
Admitting: OBSTETRICS & GYNECOLOGY
Payer: COMMERCIAL

## 2023-08-18 VITALS — SYSTOLIC BLOOD PRESSURE: 129 MMHG | BODY MASS INDEX: 29.6 KG/M2 | WEIGHT: 186.2 LBS | DIASTOLIC BLOOD PRESSURE: 79 MMHG

## 2023-08-18 VITALS — DIASTOLIC BLOOD PRESSURE: 70 MMHG | BODY MASS INDEX: 29.51 KG/M2 | SYSTOLIC BLOOD PRESSURE: 104 MMHG | WEIGHT: 185.6 LBS

## 2023-08-18 DIAGNOSIS — F12.10 MILD TETRAHYDROCANNABINOL (THC) ABUSE: ICD-10-CM

## 2023-08-18 DIAGNOSIS — Z3A.32 32 WEEKS GESTATION OF PREGNANCY: ICD-10-CM

## 2023-08-18 DIAGNOSIS — O36.5930 POOR FETAL GROWTH AFFECTING MANAGEMENT OF MOTHER IN THIRD TRIMESTER, SINGLE OR UNSPECIFIED FETUS: Primary | ICD-10-CM

## 2023-08-18 DIAGNOSIS — O35.09X0 CEREBRAL VENTRICULOMEGALY, FETAL, AFFECTING CARE OF MOTHER: ICD-10-CM

## 2023-08-18 DIAGNOSIS — O99.019 MATERNAL ANEMIA IN PREGNANCY, ANTEPARTUM: ICD-10-CM

## 2023-08-18 DIAGNOSIS — Z34.90 PREGNANCY, UNSPECIFIED GESTATIONAL AGE: ICD-10-CM

## 2023-08-18 DIAGNOSIS — O36.5930 POOR FETAL GROWTH AFFECTING MANAGEMENT OF MOTHER IN THIRD TRIMESTER, SINGLE OR UNSPECIFIED FETUS: ICD-10-CM

## 2023-08-18 LAB
GLUCOSE UR STRIP-MCNC: NEGATIVE MG/DL
PROT UR STRIP-MCNC: ABNORMAL MG/DL

## 2023-08-18 PROCEDURE — 76816 OB US FOLLOW-UP PER FETUS: CPT

## 2023-08-18 PROCEDURE — 76820 UMBILICAL ARTERY ECHO: CPT

## 2023-08-18 PROCEDURE — 76819 FETAL BIOPHYS PROFIL W/O NST: CPT

## 2023-08-18 NOTE — PROGRESS NOTES
Patient seen in Maternal Fetal Medicine clinic today. Please see full note in under imaging tab of patient chart in Epic (Viewpoint report).    Rina Benavides MD

## 2023-08-23 ENCOUNTER — ROUTINE PRENATAL (OUTPATIENT)
Dept: OBSTETRICS AND GYNECOLOGY | Facility: CLINIC | Age: 23
End: 2023-08-23
Payer: COMMERCIAL

## 2023-08-23 VITALS — BODY MASS INDEX: 29.73 KG/M2 | WEIGHT: 187 LBS | SYSTOLIC BLOOD PRESSURE: 118 MMHG | DIASTOLIC BLOOD PRESSURE: 80 MMHG

## 2023-08-23 DIAGNOSIS — O99.019 MATERNAL ANEMIA IN PREGNANCY, ANTEPARTUM: ICD-10-CM

## 2023-08-23 DIAGNOSIS — O36.5930 POOR FETAL GROWTH AFFECTING MANAGEMENT OF MOTHER IN THIRD TRIMESTER, SINGLE OR UNSPECIFIED FETUS: ICD-10-CM

## 2023-08-23 DIAGNOSIS — Z34.90 PRENATAL CARE, ANTEPARTUM: Primary | ICD-10-CM

## 2023-08-23 LAB
GLUCOSE UR STRIP-MCNC: NEGATIVE MG/DL
PROT UR STRIP-MCNC: NEGATIVE MG/DL

## 2023-08-23 NOTE — PROGRESS NOTES
OB FOLLOW UP  CC- Here for care of pregnancy        Elaina Pena is a 23 y.o.  33w4d patient being seen today for her obstetrical follow up visit. Patient reports no complaints..     Her prenatal care is complicated by (and status) :   Patient Active Problem List   Diagnosis    Prenatal care, antepartum    Mild tetrahydrocannabinol (THC) abuse    History of abnormal cervical Pap smear    Poor fetal growth affecting management of mother in third trimester    Maternal anemia in pregnancy, antepartum         Ultrasound Today: No  Non Stress Test: Yes minutes 50  non-stress test: NST: Reactive  indication:  SGA  category: Category I    ROS -   Patient Reports : No Problems  Patient Denies: Loss of Fluid, Vaginal Spotting, Vision Changes, Headaches, and Contractions  Fetal Movement : normal  All other systems reviewed and are negative.       The additional following portions of the patient's history were reviewed and updated as appropriate: allergies, current medications, past family history, past medical history, past social history, past surgical history, and problem list.    I have reviewed and agree with the HPI, ROS, and historical information as entered above. Anita Flanagan MD      /80   Wt 84.8 kg (187 lb)   LMP 2022   BMI 29.73 kg/mý       EXAM:     Prenatal Vitals  BP: 118/80  Weight: 84.8 kg (187 lb)   Fetal Heart Rate: 150bpm               Urine Glucose Read-only: Negative  Urine Protein Read-only: Negative           Assessment and Plan    Problem List Items Addressed This Visit          Gravid and     Prenatal care, antepartum - Primary    Overview     cfDNA negative.  Boy!         Relevant Orders    POC Urinalysis Dipstick (Completed)    Poor fetal growth affecting management of mother in third trimester    Overview     23-AC 4%, HC 9%, EFW 28%  2023-PDC evaluation showed AC-F 3rd percentile and normal growth.     PDC: Mild but improved AC lag  currently 7th%, lateral ventricles appear wnl. With continued AC lag recommended weekly  testing until next growth scan 23 SIUP is noted in cephalic presentation with biometry demonstrating stable but lagging growth. EFW overall measures at the 37th percentile. AC  measures at the 6th percentile. There is a normal amount of amniotic fluid. Placenta is posterior. BPP is 8/8. UA Dopplers are normal. F/U 3 weeks repeat growth scan 23 @PDC            Hematology and Neoplasia    Maternal anemia in pregnancy, antepartum    Overview     11.2         Relevant Medications    Ferrous Sulfate (IRON PO)       Pregnancy at 33w4d  Fetal status reassuring.   Activity and Exercise discussed.  Fetal movement/PTL or Labor precautions  GBS next visit  Return in about 2 weeks (around 2023).    Anita Flanagan MD  2023

## 2023-09-07 ENCOUNTER — LAB (OUTPATIENT)
Dept: LAB | Facility: HOSPITAL | Age: 23
End: 2023-09-07
Payer: COMMERCIAL

## 2023-09-07 ENCOUNTER — ROUTINE PRENATAL (OUTPATIENT)
Dept: OBSTETRICS AND GYNECOLOGY | Facility: CLINIC | Age: 23
End: 2023-09-07
Payer: COMMERCIAL

## 2023-09-07 VITALS — BODY MASS INDEX: 30.68 KG/M2 | WEIGHT: 193 LBS | DIASTOLIC BLOOD PRESSURE: 84 MMHG | SYSTOLIC BLOOD PRESSURE: 120 MMHG

## 2023-09-07 DIAGNOSIS — O99.019 MATERNAL ANEMIA IN PREGNANCY, ANTEPARTUM: ICD-10-CM

## 2023-09-07 DIAGNOSIS — F12.10 MILD TETRAHYDROCANNABINOL (THC) ABUSE: ICD-10-CM

## 2023-09-07 DIAGNOSIS — Z34.93 THIRD TRIMESTER PREGNANCY: ICD-10-CM

## 2023-09-07 DIAGNOSIS — O36.5930 POOR FETAL GROWTH AFFECTING MANAGEMENT OF MOTHER IN THIRD TRIMESTER, SINGLE OR UNSPECIFIED FETUS: ICD-10-CM

## 2023-09-07 DIAGNOSIS — Z34.93 THIRD TRIMESTER PREGNANCY: Primary | ICD-10-CM

## 2023-09-07 LAB
EXPIRATION DATE: 0
GLUCOSE UR STRIP-MCNC: NEGATIVE MG/DL
Lab: 0
PROT UR STRIP-MCNC: NEGATIVE MG/DL

## 2023-09-07 PROCEDURE — 87081 CULTURE SCREEN ONLY: CPT

## 2023-09-07 NOTE — PROGRESS NOTES
OB FOLLOW UP  CC- Here for care of pregnancy        Elaina Pena is a 23 y.o.  35w5d patient being seen today for her obstetrical follow up visit. Patient reports no complaints.     Her prenatal care is complicated by (and status) :   Patient Active Problem List   Diagnosis    Prenatal care, antepartum    Mild tetrahydrocannabinol (THC) abuse    History of abnormal cervical Pap smear    Poor fetal growth affecting management of mother in third trimester    Maternal anemia in pregnancy, antepartum       GBS Status: Done Today. She is not allergic to PCN.    No Known Allergies       Her Delivery Plan is: Undecided    US today: no  Non Stress Test: No.    ROS -   Patient Reports : No Problems  Patient Denies: Loss of Fluid, Vaginal Spotting, Vision Changes, Headaches, Nausea , Vomiting , Contractions, and Epigastric pain  Fetal Movement : normal  All other systems reviewed and are negative.       The additional following portions of the patient's history were reviewed and updated as appropriate: allergies and current medications.    I have reviewed and agree with the HPI, ROS, and historical information as entered above. Sol Jackson, APRN        EXAM:     Prenatal Vitals  BP: 120/84  Weight: 87.5 kg (193 lb)   Fetal Heart Rate: 147       Dilation/Effacement/Station  Dilation: Fingertip  Effacement (%): 60      Urine Glucose Read-only: Negative  Urine Protein Read-only: Negative           Assessment and Plan    Problem List Items Addressed This Visit       Mild tetrahydrocannabinol (THC) abuse    Overview     Positive at new OB visit on 2023  Positive on 23  Repeat at 28 week visit-positive         Poor fetal growth affecting management of mother in third trimester    Overview     23-AC 4%, HC 9%, EFW 28%  2023-PDC evaluation showed AC-F 3rd percentile and normal growth.     PDC: Mild but improved AC lag currently 7th%, lateral ventricles appear wnl. With continued AC lag  recommended weekly  testing until next growth scan 23 SIUP is noted in cephalic presentation with biometry demonstrating stable but lagging growth. EFW overall measures at the 37th percentile. AC  measures at the 6th percentile. There is a normal amount of amniotic fluid. Placenta is posterior. BPP is 8/8. UA Dopplers are normal. F/U 3 weeks repeat growth scan 23 @PDC         Maternal anemia in pregnancy, antepartum    Overview     11.2         Relevant Medications    Ferrous Sulfate (IRON PO)     Other Visit Diagnoses       Third trimester pregnancy    -  Primary    Relevant Orders    POC Glucose, Urine, Qualitative, Dipstick (Completed)    POC Protein, Urine, Qualitative, Dipstick (Completed)    Group B Streptococcus Culture - Swab, Vaginal/Rectum            Pregnancy at 35w5d. GBS done today.  Fetal status reassuring.   Reviewed Pre-eclampsia signs/symptoms  Discussed options for IOL. Patient desires spontaneous labor. Would like to postpone an IOL unless medically indicated.   Delivery options reviewed with patient  Signs of labor reviewed  Kick counts reviewed  Activity and Exercise discussed.  PDC appt tomorrow 23 for FU growth  Return in about 1 week (around 2023) for SYLVIA Jackson, APRN  2023

## 2023-09-08 ENCOUNTER — OFFICE VISIT (OUTPATIENT)
Dept: OBSTETRICS AND GYNECOLOGY | Facility: HOSPITAL | Age: 23
End: 2023-09-08
Payer: COMMERCIAL

## 2023-09-08 ENCOUNTER — HOSPITAL ENCOUNTER (OUTPATIENT)
Dept: WOMENS IMAGING | Facility: HOSPITAL | Age: 23
Discharge: HOME | End: 2023-09-08
Admitting: OBSTETRICS & GYNECOLOGY
Payer: COMMERCIAL

## 2023-09-08 VITALS — BODY MASS INDEX: 30.59 KG/M2 | DIASTOLIC BLOOD PRESSURE: 77 MMHG | WEIGHT: 192.4 LBS | SYSTOLIC BLOOD PRESSURE: 136 MMHG

## 2023-09-08 DIAGNOSIS — Z3A.35 35 WEEKS GESTATION OF PREGNANCY: ICD-10-CM

## 2023-09-08 DIAGNOSIS — O36.5930 POOR FETAL GROWTH AFFECTING MANAGEMENT OF MOTHER IN THIRD TRIMESTER, SINGLE OR UNSPECIFIED FETUS: ICD-10-CM

## 2023-09-08 DIAGNOSIS — O36.5930 POOR FETAL GROWTH AFFECTING MANAGEMENT OF MOTHER IN THIRD TRIMESTER, SINGLE OR UNSPECIFIED FETUS: Primary | ICD-10-CM

## 2023-09-08 PROCEDURE — 76819 FETAL BIOPHYS PROFIL W/O NST: CPT

## 2023-09-08 PROCEDURE — 76816 OB US FOLLOW-UP PER FETUS: CPT

## 2023-09-08 PROCEDURE — 76820 UMBILICAL ARTERY ECHO: CPT

## 2023-09-10 LAB — BACTERIA SPEC AEROBE CULT: NORMAL

## 2023-09-15 ENCOUNTER — ROUTINE PRENATAL (OUTPATIENT)
Dept: OBSTETRICS AND GYNECOLOGY | Facility: CLINIC | Age: 23
End: 2023-09-15
Payer: COMMERCIAL

## 2023-09-15 VITALS — BODY MASS INDEX: 30.94 KG/M2 | DIASTOLIC BLOOD PRESSURE: 86 MMHG | WEIGHT: 194.6 LBS | SYSTOLIC BLOOD PRESSURE: 120 MMHG

## 2023-09-15 DIAGNOSIS — O36.5930 POOR FETAL GROWTH AFFECTING MANAGEMENT OF MOTHER IN THIRD TRIMESTER, SINGLE OR UNSPECIFIED FETUS: ICD-10-CM

## 2023-09-15 DIAGNOSIS — O99.019 MATERNAL ANEMIA IN PREGNANCY, ANTEPARTUM: ICD-10-CM

## 2023-09-15 DIAGNOSIS — Z34.93 THIRD TRIMESTER PREGNANCY: Primary | ICD-10-CM

## 2023-09-15 LAB
CLARITY, POC: CLEAR
COLOR UR: ABNORMAL
GLUCOSE UR STRIP-MCNC: NEGATIVE MG/DL
PROT UR STRIP-MCNC: ABNORMAL MG/DL

## 2023-09-15 NOTE — PROGRESS NOTES
OB FOLLOW UP  CC- Here for care of pregnancy        Elaina Pena is a 23 y.o.  36w6d patient being seen today for her obstetrical follow up visit. Patient reports intermittent trace swelling at the end of the day, it is not pitting. She has been experiencing heartburn, relieved by tums.    Her prenatal care is complicated by (and status) :   Patient Active Problem List   Diagnosis    Prenatal care, antepartum    Mild tetrahydrocannabinol (THC) abuse    History of abnormal cervical Pap smear    Poor fetal growth affecting management of mother in third trimester    Maternal anemia in pregnancy, antepartum       GBS Status:   Group B Strep Culture   Date Value Ref Range Status   2023 No Group B Streptococcus isolated  Final         No Known Allergies       Her Delivery Plan is:  desires spontaneous labor unless medically indicated     US today: No. Ultrasound performed 23 [facility] showed:   Impression  =========  Elaina presents for a follow-up ultrasound to assess interval growth and fetal wellbeing.     On today's exam, SIUP is noted in cephalic presentation with biometry demonstrating improved overall growth. EFW overall measures at the 36th percentile. AC  measures at the 12th percentile. There is a normal amount of amniotic fluid. Placenta is posterior fundal. BPP is 8/8. UA Dopplers are normal.     Limited but normal appearing anatomy is visualized.     Recommendation  ===============  Further scans at your discretion.  No reason for early IOL based on growth trajectory.  Non Stress Test: No.    ROS -   Patient Denies: Loss of Fluid, Vaginal Spotting, Vision Changes, Headaches, Nausea , Vomiting , Contractions, and Epigastric pain  Fetal Movement : normal  All other systems reviewed and are negative.       The additional following portions of the patient's history were reviewed and updated as appropriate: allergies, current medications, past family history, past medical history, past social  history, past surgical history, and problem list.    I have reviewed and agree with the HPI, ROS, and historical information as entered above. Anita Flanagan MD        EXAM:     Prenatal Vitals  BP: 120/86  Weight: 88.3 kg (194 lb 9.6 oz)   Fetal Heart Rate: 153   Fundal Height (cm): 36 cm   Dilation/Effacement/Station  Dilation: Closed      Urine Glucose Read-only: Negative  Urine Protein Read-only: (!) Trace           Assessment and Plan    Problem List Items Addressed This Visit          Gravid and     Poor fetal growth affecting management of mother in third trimester    Overview     23-AC 4%, HC 9%, EFW 28%  2023-PDC evaluation showed AC-F 3rd percentile and normal growth.     PDC: Mild but improved AC lag currently 7th%, lateral ventricles appear wnl. With continued AC lag recommended weekly  testing until next growth scan 23 SIUP is noted in cephalic presentation with biometry demonstrating stable but lagging growth. EFW overall measures at the 37th percentile. AC  measures at the 6th percentile. There is a normal amount of amniotic fluid. Placenta is posterior. BPP is 8/8. UA Dopplers are normal.  2023 baby measured 5 pounds 13 ounces which is 36 percentile.  AC was 12 percentile.  No further scans for PDC recommended.            Hematology and Neoplasia    Maternal anemia in pregnancy, antepartum    Overview     11.2         Relevant Medications    Ferrous Sulfate (IRON PO)     Other Visit Diagnoses       Third trimester pregnancy    -  Primary    Relevant Orders    POC Urinalysis Dipstick (Completed)            Pregnancy at 36w6d  Fetal status reassuring.   Reviewed Pre-eclampsia signs/symptoms  Delivery options reviewed with patient  Signs of labor reviewed  Kick counts reviewed  Activity and Exercise discussed.  Return in about 1 week (around 2023).    Anita Flanagan MD  09/15/2023

## 2023-09-22 ENCOUNTER — ROUTINE PRENATAL (OUTPATIENT)
Dept: OBSTETRICS AND GYNECOLOGY | Facility: CLINIC | Age: 23
End: 2023-09-22
Payer: COMMERCIAL

## 2023-09-22 VITALS — SYSTOLIC BLOOD PRESSURE: 124 MMHG | DIASTOLIC BLOOD PRESSURE: 84 MMHG | WEIGHT: 199.2 LBS | BODY MASS INDEX: 31.67 KG/M2

## 2023-09-22 DIAGNOSIS — Z34.93 PRENATAL CARE IN THIRD TRIMESTER: Primary | ICD-10-CM

## 2023-09-22 LAB
GLUCOSE UR STRIP-MCNC: NEGATIVE MG/DL
PROT UR STRIP-MCNC: NEGATIVE MG/DL

## 2023-09-22 NOTE — PROGRESS NOTES
OB FOLLOW UP  CC- Here for care of pregnancy        Elaina Pena is a 23 y.o.  37w6d patient being seen today for her obstetrical follow up visit. Patient reports swelling in both lower extremities at the end of the day. It is Non-Pitting.   Last ultrasound showed AC back up to 12% with EFW 36%.  Patient would like a cervical check today.    Her prenatal care is complicated by (and status) :   Patient Active Problem List   Diagnosis    Prenatal care, antepartum    Mild tetrahydrocannabinol (THC) abuse    History of abnormal cervical Pap smear    Poor fetal growth affecting management of mother in third trimester    Maternal anemia in pregnancy, antepartum       GBS Status:   Group B Strep Culture   Date Value Ref Range Status   2023 No Group B Streptococcus isolated  Final         No Known Allergies       Her Delivery Plan is: Desires IOL at 39wks. Scheduled  10/01/23 at 5 PM  US today: no  Non Stress Test: No.    ROS -   Patient Reports :  see above  Patient Denies: Loss of Fluid, Vaginal Spotting, Vision Changes, Headaches, Nausea , Vomiting , Contractions, and Epigastric pain  Fetal Movement : normal  All other systems reviewed and are negative.       The additional following portions of the patient's history were reviewed and updated as appropriate: allergies and current medications.    I have reviewed and agree with the HPI, ROS, and historical information as entered above. YAN Ashby        EXAM:     Prenatal Vitals  BP: 124/84  Weight: 90.4 kg (199 lb 3.2 oz)   Fetal Heart Rate: 151   Fundal Height (cm): 37 cm   Dilation/Effacement/Station  Dilation: Fingertip      Urine Glucose Read-only: Negative  Urine Protein Read-only: Negative           Assessment and Plan    Problem List Items Addressed This Visit    None  Visit Diagnoses       Prenatal care in third trimester    -  Primary    Relevant Orders    POC Urinalysis Dipstick (Completed)            Pregnancy at 37w6d  Fetal status  reassuring.   Reviewed Pre-eclampsia signs/symptoms  Reviewed upcoming IOL with patient. Instructions given.  Signs of labor reviewed  Kick counts reviewed  Activity and Exercise discussed.  Return in about 1 week (around 9/29/2023) for Masha VALVERDE.    Deirdre Ruiz, YAN  09/22/2023

## 2023-09-28 ENCOUNTER — ROUTINE PRENATAL (OUTPATIENT)
Dept: OBSTETRICS AND GYNECOLOGY | Facility: CLINIC | Age: 23
End: 2023-09-28
Payer: COMMERCIAL

## 2023-09-28 ENCOUNTER — PREP FOR SURGERY (OUTPATIENT)
Dept: OTHER | Facility: HOSPITAL | Age: 23
End: 2023-09-28
Payer: COMMERCIAL

## 2023-09-28 VITALS — BODY MASS INDEX: 31.8 KG/M2 | SYSTOLIC BLOOD PRESSURE: 128 MMHG | DIASTOLIC BLOOD PRESSURE: 80 MMHG | WEIGHT: 200 LBS

## 2023-09-28 DIAGNOSIS — Z34.90 PRENATAL CARE, ANTEPARTUM: Primary | ICD-10-CM

## 2023-09-28 DIAGNOSIS — F12.10 MILD TETRAHYDROCANNABINOL (THC) ABUSE: ICD-10-CM

## 2023-09-28 DIAGNOSIS — O99.019 MATERNAL ANEMIA IN PREGNANCY, ANTEPARTUM: ICD-10-CM

## 2023-09-28 DIAGNOSIS — O36.5930 POOR FETAL GROWTH AFFECTING MANAGEMENT OF MOTHER IN THIRD TRIMESTER, SINGLE OR UNSPECIFIED FETUS: ICD-10-CM

## 2023-09-28 LAB
GLUCOSE UR STRIP-MCNC: NEGATIVE MG/DL
PROT UR STRIP-MCNC: NEGATIVE MG/DL

## 2023-09-28 RX ORDER — HYDROCODONE BITARTRATE AND ACETAMINOPHEN 10; 325 MG/1; MG/1
1 TABLET ORAL EVERY 4 HOURS PRN
Status: CANCELLED | OUTPATIENT
Start: 2023-09-28 | End: 2023-10-05

## 2023-09-28 RX ORDER — ACETAMINOPHEN 325 MG/1
650 TABLET ORAL EVERY 4 HOURS PRN
Status: CANCELLED | OUTPATIENT
Start: 2023-09-28

## 2023-09-28 RX ORDER — OXYTOCIN/0.9 % SODIUM CHLORIDE 30/500 ML
999 PLASTIC BAG, INJECTION (ML) INTRAVENOUS ONCE
Status: CANCELLED | OUTPATIENT
Start: 2023-09-28 | End: 2023-09-28

## 2023-09-28 RX ORDER — SODIUM CHLORIDE 0.9 % (FLUSH) 0.9 %
10 SYRINGE (ML) INJECTION EVERY 12 HOURS SCHEDULED
Status: CANCELLED | OUTPATIENT
Start: 2023-09-28

## 2023-09-28 RX ORDER — MORPHINE SULFATE 1 MG/ML
1 INJECTION, SOLUTION EPIDURAL; INTRATHECAL; INTRAVENOUS EVERY 4 HOURS PRN
Status: CANCELLED | OUTPATIENT
Start: 2023-09-28 | End: 2023-10-05

## 2023-09-28 RX ORDER — METHYLERGONOVINE MALEATE 0.2 MG/ML
200 INJECTION INTRAVENOUS ONCE AS NEEDED
Status: CANCELLED | OUTPATIENT
Start: 2023-09-28

## 2023-09-28 RX ORDER — CARBOPROST TROMETHAMINE 250 UG/ML
250 INJECTION, SOLUTION INTRAMUSCULAR AS NEEDED
Status: CANCELLED | OUTPATIENT
Start: 2023-09-28

## 2023-09-28 RX ORDER — LIDOCAINE HYDROCHLORIDE 10 MG/ML
0.5 INJECTION, SOLUTION EPIDURAL; INFILTRATION; INTRACAUDAL; PERINEURAL ONCE AS NEEDED
Status: CANCELLED | OUTPATIENT
Start: 2023-09-28

## 2023-09-28 RX ORDER — HYDROCODONE BITARTRATE AND ACETAMINOPHEN 5; 325 MG/1; MG/1
1 TABLET ORAL EVERY 4 HOURS PRN
Status: CANCELLED | OUTPATIENT
Start: 2023-09-28 | End: 2023-10-05

## 2023-09-28 RX ORDER — OXYTOCIN/0.9 % SODIUM CHLORIDE 30/500 ML
250 PLASTIC BAG, INJECTION (ML) INTRAVENOUS CONTINUOUS
Status: CANCELLED | OUTPATIENT
Start: 2023-09-28 | End: 2023-09-28

## 2023-09-28 RX ORDER — SODIUM CHLORIDE 0.9 % (FLUSH) 0.9 %
10 SYRINGE (ML) INJECTION AS NEEDED
Status: CANCELLED | OUTPATIENT
Start: 2023-09-28

## 2023-09-28 RX ORDER — MORPHINE SULFATE 2 MG/ML
2 INJECTION, SOLUTION INTRAMUSCULAR; INTRAVENOUS EVERY 4 HOURS PRN
Status: CANCELLED | OUTPATIENT
Start: 2023-09-28 | End: 2023-10-05

## 2023-09-28 RX ORDER — IBUPROFEN 600 MG/1
600 TABLET ORAL EVERY 6 HOURS PRN
Status: CANCELLED | OUTPATIENT
Start: 2023-09-28

## 2023-09-28 RX ORDER — NALOXONE HCL 0.4 MG/ML
0.4 VIAL (ML) INJECTION
Status: CANCELLED | OUTPATIENT
Start: 2023-09-28

## 2023-09-28 RX ORDER — SODIUM CHLORIDE, SODIUM LACTATE, POTASSIUM CHLORIDE, CALCIUM CHLORIDE 600; 310; 30; 20 MG/100ML; MG/100ML; MG/100ML; MG/100ML
125 INJECTION, SOLUTION INTRAVENOUS CONTINUOUS
Status: CANCELLED | OUTPATIENT
Start: 2023-09-28

## 2023-09-28 RX ORDER — SODIUM CHLORIDE 9 MG/ML
40 INJECTION, SOLUTION INTRAVENOUS AS NEEDED
Status: CANCELLED | OUTPATIENT
Start: 2023-09-28

## 2023-09-28 RX ORDER — MISOPROSTOL 200 UG/1
800 TABLET ORAL AS NEEDED
Status: CANCELLED | OUTPATIENT
Start: 2023-09-28

## 2023-09-28 RX ORDER — MAGNESIUM CARB/ALUMINUM HYDROX 105-160MG
30 TABLET,CHEWABLE ORAL ONCE
Status: CANCELLED | OUTPATIENT
Start: 2023-09-28 | End: 2023-09-28

## 2023-09-28 RX ORDER — OXYTOCIN/0.9 % SODIUM CHLORIDE 30/500 ML
2-24 PLASTIC BAG, INJECTION (ML) INTRAVENOUS
Status: CANCELLED | OUTPATIENT
Start: 2023-09-28

## 2023-09-28 NOTE — PROGRESS NOTES
"    OB FOLLOW UP  CC- Here for care of pregnancy        Elaina Pena is a 23 y.o.  38w5d patient being seen today for her obstetrical follow up visit. Patient reports no complaints..     Her prenatal care is complicated by (and status) :   Patient Active Problem List   Diagnosis    Prenatal care, antepartum    Mild tetrahydrocannabinol (THC) abuse    History of abnormal cervical Pap smear    Poor fetal growth affecting management of mother in third trimester    Maternal anemia in pregnancy, antepartum       GBS Status:   Group B Strep Culture   Date Value Ref Range Status   2023 No Group B Streptococcus isolated  Final         No Known Allergies       Her Delivery Plan is:  IOL scheduled for 10/1/23      today: no, last scan at [facility] on 23 stated \"further scans at your discretion, no reason for early IOL based on growth trajectory\"  SIUP is noted in cephalic presentation with biometry demonstrating improved overall growth. EFW overall measures at the 36th percentile. AC  measures at the 12th percentile. There is a normal amount of amniotic fluid. Placenta is posterior fundal. BPP is 8/8. UA Dopplers are normal.  Non Stress Test: No.    ROS -   Patient Denies: Loss of Fluid, Vaginal Spotting, Vision Changes, Headaches, and Contractions  Fetal Movement : normal  All other systems reviewed and are negative.       The additional following portions of the patient's history were reviewed and updated as appropriate: allergies, current medications, past family history, past medical history, past social history, past surgical history, and problem list.    I have reviewed and agree with the HPI, ROS, and historical information as entered above. Anita Flanagan MD        EXAM:     Prenatal Vitals  BP: 128/80  Weight: 90.7 kg (200 lb)   Fetal Heart Rate: 142   Fundal Height (cm): 38 cm   Dilation/Effacement/Station  Dilation: Fingertip  Effacement (%): 50  Station: -2      Urine Glucose Read-only: " Negative  Urine Protein Read-only: Negative           Assessment and Plan    Problem List Items Addressed This Visit          Gravid and     Prenatal care, antepartum - Primary    Overview     cfDNA negative.  Boy!    IOL 10/1 @ 5PM         Relevant Orders    POC Urinalysis Dipstick (Completed)    Poor fetal growth affecting management of mother in third trimester    Overview     23-AC 4%, HC 9%, EFW 28%  2023-PDC evaluation showed AC-F 3rd percentile and normal growth.     PDC: Mild but improved AC lag currently 7th%, lateral ventricles appear wnl. With continued AC lag recommended weekly  testing until next growth scan 23 SIUP is noted in cephalic presentation with biometry demonstrating stable but lagging growth. EFW overall measures at the 37th percentile. AC  measures at the 6th percentile. There is a normal amount of amniotic fluid. Placenta is posterior. BPP is 8/8. UA Dopplers are normal.  2023 baby measured 5 pounds 13 ounces which is 36 percentile.  AC was 12 percentile.  No further scans for PDC recommended.            Hematology and Neoplasia    Maternal anemia in pregnancy, antepartum    Overview     11.2         Relevant Medications    Ferrous Sulfate (IRON PO)       Mental Health    Mild tetrahydrocannabinol (THC) abuse    Overview     Positive at new OB visit on 2023  Positive on 23  Repeat at 28 week visit-positive            Pregnancy at 38w5d  Fetal status reassuring.   Reviewed Pre-eclampsia signs/symptoms  Reviewed upcoming IOL with patient. Instructions given.  Delivery options reviewed with patient  Signs of labor reviewed  Kick counts reviewed  Activity and Exercise discussed.  Return in about 7 years (around 2030) for Postpartum check.    Anita Flanagan MD  2023

## 2023-10-01 ENCOUNTER — HOSPITAL ENCOUNTER (OUTPATIENT)
Dept: LABOR AND DELIVERY | Facility: HOSPITAL | Age: 23
Discharge: HOME | End: 2023-10-01
Payer: COMMERCIAL

## 2023-10-01 ENCOUNTER — HOSPITAL ENCOUNTER (INPATIENT)
Facility: HOSPITAL | Age: 23
LOS: 4 days | Discharge: HOME | End: 2023-10-05
Attending: OBSTETRICS & GYNECOLOGY | Admitting: OBSTETRICS & GYNECOLOGY
Payer: COMMERCIAL

## 2023-10-01 DIAGNOSIS — Z34.90 PRENATAL CARE, ANTEPARTUM: ICD-10-CM

## 2023-10-01 LAB
ABO GROUP BLD: NORMAL
ABO GROUP BLD: NORMAL
ALP SERPL-CCNC: 225 U/L (ref 39–117)
ALT SERPL W P-5'-P-CCNC: 11 U/L (ref 1–33)
AMPHET+METHAMPHET UR QL: NEGATIVE
AMPHETAMINES UR QL: NEGATIVE
AST SERPL-CCNC: 26 U/L (ref 1–32)
BARBITURATES UR QL SCN: NEGATIVE
BASOPHILS # BLD AUTO: 0.02 10*3/MM3 (ref 0–0.2)
BASOPHILS NFR BLD AUTO: 0.3 % (ref 0–1.5)
BENZODIAZ UR QL SCN: NEGATIVE
BILIRUB SERPL-MCNC: 0.4 MG/DL (ref 0–1.2)
BLD GP AB SCN SERPL QL: NEGATIVE
BUPRENORPHINE SERPL-MCNC: NEGATIVE NG/ML
CANNABINOIDS SERPL QL: POSITIVE
COCAINE UR QL: NEGATIVE
CREAT SERPL-MCNC: 0.57 MG/DL (ref 0.57–1)
DEPRECATED RDW RBC AUTO: 38.9 FL (ref 37–54)
EOSINOPHIL # BLD AUTO: 0.04 10*3/MM3 (ref 0–0.4)
EOSINOPHIL NFR BLD AUTO: 0.6 % (ref 0.3–6.2)
ERYTHROCYTE [DISTWIDTH] IN BLOOD BY AUTOMATED COUNT: 13.2 % (ref 12.3–15.4)
FENTANYL UR-MCNC: NEGATIVE NG/ML
HCT VFR BLD AUTO: 36.9 % (ref 34–46.6)
HGB BLD-MCNC: 12.2 G/DL (ref 12–15.9)
IMM GRANULOCYTES # BLD AUTO: 0.01 10*3/MM3 (ref 0–0.05)
IMM GRANULOCYTES NFR BLD AUTO: 0.1 % (ref 0–0.5)
LDH SERPL-CCNC: 261 U/L (ref 135–214)
LYMPHOCYTES # BLD AUTO: 1.4 10*3/MM3 (ref 0.7–3.1)
LYMPHOCYTES NFR BLD AUTO: 19.3 % (ref 19.6–45.3)
MCH RBC QN AUTO: 27.1 PG (ref 26.6–33)
MCHC RBC AUTO-ENTMCNC: 33.1 G/DL (ref 31.5–35.7)
MCV RBC AUTO: 82 FL (ref 79–97)
METHADONE UR QL SCN: NEGATIVE
MONOCYTES # BLD AUTO: 0.77 10*3/MM3 (ref 0.1–0.9)
MONOCYTES NFR BLD AUTO: 10.6 % (ref 5–12)
NEUTROPHILS NFR BLD AUTO: 5.01 10*3/MM3 (ref 1.7–7)
NEUTROPHILS NFR BLD AUTO: 69.1 % (ref 42.7–76)
NRBC BLD AUTO-RTO: 0 /100 WBC (ref 0–0.2)
OPIATES UR QL: NEGATIVE
OXYCODONE UR QL SCN: NEGATIVE
PCP UR QL SCN: NEGATIVE
PLATELET # BLD AUTO: 147 10*3/MM3 (ref 140–450)
PMV BLD AUTO: 12.4 FL (ref 6–12)
PROPOXYPH UR QL: NEGATIVE
RBC # BLD AUTO: 4.5 10*6/MM3 (ref 3.77–5.28)
RH BLD: POSITIVE
RH BLD: POSITIVE
T&S EXPIRATION DATE: NORMAL
TRICYCLICS UR QL SCN: NEGATIVE
URATE SERPL-MCNC: 6.4 MG/DL (ref 2.4–5.7)
WBC NRBC COR # BLD: 7.25 10*3/MM3 (ref 3.4–10.8)

## 2023-10-01 PROCEDURE — 80307 DRUG TEST PRSMV CHEM ANLYZR: CPT | Performed by: OBSTETRICS & GYNECOLOGY

## 2023-10-01 PROCEDURE — G0480 DRUG TEST DEF 1-7 CLASSES: HCPCS | Performed by: OBSTETRICS & GYNECOLOGY

## 2023-10-01 PROCEDURE — 85025 COMPLETE CBC W/AUTO DIFF WBC: CPT | Performed by: OBSTETRICS & GYNECOLOGY

## 2023-10-01 PROCEDURE — 82565 ASSAY OF CREATININE: CPT | Performed by: OBSTETRICS & GYNECOLOGY

## 2023-10-01 PROCEDURE — 84075 ASSAY ALKALINE PHOSPHATASE: CPT | Performed by: OBSTETRICS & GYNECOLOGY

## 2023-10-01 PROCEDURE — 83615 LACTATE (LD) (LDH) ENZYME: CPT | Performed by: OBSTETRICS & GYNECOLOGY

## 2023-10-01 PROCEDURE — 86901 BLOOD TYPING SEROLOGIC RH(D): CPT | Performed by: OBSTETRICS & GYNECOLOGY

## 2023-10-01 PROCEDURE — 82247 BILIRUBIN TOTAL: CPT | Performed by: OBSTETRICS & GYNECOLOGY

## 2023-10-01 PROCEDURE — 86900 BLOOD TYPING SEROLOGIC ABO: CPT

## 2023-10-01 PROCEDURE — 84450 TRANSFERASE (AST) (SGOT): CPT | Performed by: OBSTETRICS & GYNECOLOGY

## 2023-10-01 PROCEDURE — 59200 INSERT CERVICAL DILATOR: CPT | Performed by: OBSTETRICS & GYNECOLOGY

## 2023-10-01 PROCEDURE — 84550 ASSAY OF BLOOD/URIC ACID: CPT | Performed by: OBSTETRICS & GYNECOLOGY

## 2023-10-01 PROCEDURE — 84460 ALANINE AMINO (ALT) (SGPT): CPT | Performed by: OBSTETRICS & GYNECOLOGY

## 2023-10-01 PROCEDURE — 86850 RBC ANTIBODY SCREEN: CPT | Performed by: OBSTETRICS & GYNECOLOGY

## 2023-10-01 PROCEDURE — 86901 BLOOD TYPING SEROLOGIC RH(D): CPT

## 2023-10-01 PROCEDURE — 59025 FETAL NON-STRESS TEST: CPT

## 2023-10-01 PROCEDURE — 86900 BLOOD TYPING SEROLOGIC ABO: CPT | Performed by: OBSTETRICS & GYNECOLOGY

## 2023-10-01 RX ORDER — LIDOCAINE HYDROCHLORIDE 10 MG/ML
0.5 INJECTION, SOLUTION EPIDURAL; INFILTRATION; INTRACAUDAL; PERINEURAL ONCE AS NEEDED
Status: DISCONTINUED | OUTPATIENT
Start: 2023-10-01 | End: 2023-10-02

## 2023-10-01 RX ORDER — MORPHINE SULFATE 2 MG/ML
1 INJECTION, SOLUTION INTRAMUSCULAR; INTRAVENOUS EVERY 4 HOURS PRN
Status: DISCONTINUED | OUTPATIENT
Start: 2023-10-01 | End: 2023-10-02

## 2023-10-01 RX ORDER — MORPHINE SULFATE 2 MG/ML
2 INJECTION, SOLUTION INTRAMUSCULAR; INTRAVENOUS EVERY 4 HOURS PRN
Status: DISCONTINUED | OUTPATIENT
Start: 2023-10-01 | End: 2023-10-02

## 2023-10-01 RX ORDER — NALOXONE HCL 0.4 MG/ML
0.4 VIAL (ML) INJECTION
Status: DISCONTINUED | OUTPATIENT
Start: 2023-10-01 | End: 2023-10-02

## 2023-10-01 RX ORDER — SODIUM CHLORIDE, SODIUM LACTATE, POTASSIUM CHLORIDE, CALCIUM CHLORIDE 600; 310; 30; 20 MG/100ML; MG/100ML; MG/100ML; MG/100ML
125 INJECTION, SOLUTION INTRAVENOUS CONTINUOUS
Status: DISCONTINUED | OUTPATIENT
Start: 2023-10-01 | End: 2023-10-02

## 2023-10-01 RX ORDER — MAGNESIUM CARB/ALUMINUM HYDROX 105-160MG
30 TABLET,CHEWABLE ORAL ONCE
Status: DISCONTINUED | OUTPATIENT
Start: 2023-10-01 | End: 2023-10-02

## 2023-10-01 RX ORDER — ACETAMINOPHEN 325 MG/1
650 TABLET ORAL EVERY 4 HOURS PRN
Status: DISCONTINUED | OUTPATIENT
Start: 2023-10-01 | End: 2023-10-02

## 2023-10-01 RX ORDER — OXYTOCIN/0.9 % SODIUM CHLORIDE 30/500 ML
2-24 PLASTIC BAG, INJECTION (ML) INTRAVENOUS
Status: DISCONTINUED | OUTPATIENT
Start: 2023-10-01 | End: 2023-10-02

## 2023-10-01 RX ADMIN — Medication 2 MILLI-UNITS/MIN: at 22:58

## 2023-10-01 RX ADMIN — SODIUM CHLORIDE, POTASSIUM CHLORIDE, SODIUM LACTATE AND CALCIUM CHLORIDE 125 ML/HR: 600; 310; 30; 20 INJECTION, SOLUTION INTRAVENOUS at 21:20

## 2023-10-02 ENCOUNTER — ANESTHESIA (OUTPATIENT)
Dept: LABOR AND DELIVERY | Facility: HOSPITAL | Age: 23
End: 2023-10-02
Payer: COMMERCIAL

## 2023-10-02 ENCOUNTER — ANESTHESIA EVENT (OUTPATIENT)
Dept: LABOR AND DELIVERY | Facility: HOSPITAL | Age: 23
End: 2023-10-02
Payer: COMMERCIAL

## 2023-10-02 PROCEDURE — 25010000002 ROPIVACAINE PER 1 MG: Performed by: NURSE ANESTHETIST, CERTIFIED REGISTERED

## 2023-10-02 PROCEDURE — 25010000002 OXYTOCIN PER 10 UNITS: Performed by: ANESTHESIOLOGY

## 2023-10-02 PROCEDURE — 25010000002 METHYLERGONOVINE MALEATE PER 0.2 MG: Performed by: ANESTHESIOLOGY

## 2023-10-02 PROCEDURE — 25010000002 FENTANYL CITRATE (PF) 50 MCG/ML SOLUTION: Performed by: NURSE ANESTHETIST, CERTIFIED REGISTERED

## 2023-10-02 PROCEDURE — 0 MORPHINE PER 10 MG: Performed by: ANESTHESIOLOGY

## 2023-10-02 PROCEDURE — 25810000003 LACTATED RINGERS PER 1000 ML: Performed by: ANESTHESIOLOGY

## 2023-10-02 PROCEDURE — 25010000002 AZITHROMYCIN PER 500 MG: Performed by: OBSTETRICS & GYNECOLOGY

## 2023-10-02 PROCEDURE — 25010000002 ONDANSETRON PER 1 MG: Performed by: ANESTHESIOLOGY

## 2023-10-02 PROCEDURE — 59510 CESAREAN DELIVERY: CPT | Performed by: OBSTETRICS & GYNECOLOGY

## 2023-10-02 PROCEDURE — 25010000002 METOCLOPRAMIDE PER 10 MG: Performed by: ANESTHESIOLOGY

## 2023-10-02 PROCEDURE — 25810000003 SODIUM CHLORIDE 0.9 % SOLUTION: Performed by: OBSTETRICS & GYNECOLOGY

## 2023-10-02 PROCEDURE — 25010000002 FENTANYL CITRATE (PF) 50 MCG/ML SOLUTION: Performed by: OBSTETRICS & GYNECOLOGY

## 2023-10-02 PROCEDURE — 25010000002 KETOROLAC TROMETHAMINE PER 15 MG: Performed by: OBSTETRICS & GYNECOLOGY

## 2023-10-02 PROCEDURE — 3E0P05Z INTRODUCTION OF ADHESION BARRIER INTO FEMALE REPRODUCTIVE, OPEN APPROACH: ICD-10-PCS | Performed by: OBSTETRICS & GYNECOLOGY

## 2023-10-02 PROCEDURE — C1755 CATHETER, INTRASPINAL: HCPCS | Performed by: ANESTHESIOLOGY

## 2023-10-02 PROCEDURE — 59514 CESAREAN DELIVERY ONLY: CPT | Performed by: OBSTETRICS & GYNECOLOGY

## 2023-10-02 PROCEDURE — C1765 ADHESION BARRIER: HCPCS | Performed by: OBSTETRICS & GYNECOLOGY

## 2023-10-02 PROCEDURE — 59025 FETAL NON-STRESS TEST: CPT

## 2023-10-02 PROCEDURE — 25010000002 CEFAZOLIN IN DEXTROSE 2-4 GM/100ML-% SOLUTION

## 2023-10-02 PROCEDURE — 51702 INSERT TEMP BLADDER CATH: CPT

## 2023-10-02 PROCEDURE — 25810000003 LACTATED RINGERS SOLUTION: Performed by: NURSE ANESTHETIST, CERTIFIED REGISTERED

## 2023-10-02 PROCEDURE — C1755 CATHETER, INTRASPINAL: HCPCS

## 2023-10-02 DEVICE — ABSORBABLE ADHESION BARRIER
Type: IMPLANTABLE DEVICE | Status: FUNCTIONAL
Brand: GYNECARE INTERCEED

## 2023-10-02 RX ORDER — EPHEDRINE SULFATE 5 MG/ML
10 INJECTION INTRAVENOUS
Status: DISCONTINUED | OUTPATIENT
Start: 2023-10-02 | End: 2023-10-02 | Stop reason: HOSPADM

## 2023-10-02 RX ORDER — KETOROLAC TROMETHAMINE 15 MG/ML
15 INJECTION, SOLUTION INTRAMUSCULAR; INTRAVENOUS EVERY 6 HOURS
Status: COMPLETED | OUTPATIENT
Start: 2023-10-03 | End: 2023-10-03

## 2023-10-02 RX ORDER — METOCLOPRAMIDE HYDROCHLORIDE 5 MG/ML
10 INJECTION INTRAMUSCULAR; INTRAVENOUS ONCE AS NEEDED
Status: DISCONTINUED | OUTPATIENT
Start: 2023-10-02 | End: 2023-10-02 | Stop reason: HOSPADM

## 2023-10-02 RX ORDER — CITRIC ACID/SODIUM CITRATE 334-500MG
30 SOLUTION, ORAL ORAL ONCE
Status: COMPLETED | OUTPATIENT
Start: 2023-10-02 | End: 2023-10-02

## 2023-10-02 RX ORDER — SODIUM CHLORIDE 0.9 % (FLUSH) 0.9 %
10 SYRINGE (ML) INJECTION EVERY 12 HOURS SCHEDULED
Status: DISCONTINUED | OUTPATIENT
Start: 2023-10-02 | End: 2023-10-02 | Stop reason: HOSPADM

## 2023-10-02 RX ORDER — OXYTOCIN/0.9 % SODIUM CHLORIDE 30/500 ML
PLASTIC BAG, INJECTION (ML) INTRAVENOUS AS NEEDED
Status: DISCONTINUED | OUTPATIENT
Start: 2023-10-02 | End: 2023-10-02 | Stop reason: SURG

## 2023-10-02 RX ORDER — SODIUM CHLORIDE 9 MG/ML
40 INJECTION, SOLUTION INTRAVENOUS AS NEEDED
Status: DISCONTINUED | OUTPATIENT
Start: 2023-10-02 | End: 2023-10-02 | Stop reason: HOSPADM

## 2023-10-02 RX ORDER — MORPHINE SULFATE 0.5 MG/ML
INJECTION, SOLUTION EPIDURAL; INTRATHECAL; INTRAVENOUS AS NEEDED
Status: DISCONTINUED | OUTPATIENT
Start: 2023-10-02 | End: 2023-10-02 | Stop reason: SURG

## 2023-10-02 RX ORDER — ACETAMINOPHEN 500 MG
1000 TABLET ORAL ONCE
Status: COMPLETED | OUTPATIENT
Start: 2023-10-02 | End: 2023-10-02

## 2023-10-02 RX ORDER — METHYLERGONOVINE MALEATE 0.2 MG/ML
200 INJECTION INTRAVENOUS AS NEEDED
Status: DISCONTINUED | OUTPATIENT
Start: 2023-10-02 | End: 2023-10-05 | Stop reason: HOSPADM

## 2023-10-02 RX ORDER — MISOPROSTOL 200 UG/1
600 TABLET ORAL AS NEEDED
Status: DISCONTINUED | OUTPATIENT
Start: 2023-10-02 | End: 2023-10-05 | Stop reason: HOSPADM

## 2023-10-02 RX ORDER — FENTANYL CITRATE 50 UG/ML
INJECTION, SOLUTION INTRAMUSCULAR; INTRAVENOUS AS NEEDED
Status: DISCONTINUED | OUTPATIENT
Start: 2023-10-02 | End: 2023-10-02 | Stop reason: SURG

## 2023-10-02 RX ORDER — HYDROCODONE BITARTRATE AND ACETAMINOPHEN 10; 325 MG/1; MG/1
1 TABLET ORAL EVERY 4 HOURS PRN
Status: DISCONTINUED | OUTPATIENT
Start: 2023-10-02 | End: 2023-10-02 | Stop reason: HOSPADM

## 2023-10-02 RX ORDER — OXYTOCIN/0.9 % SODIUM CHLORIDE 30/500 ML
250 PLASTIC BAG, INJECTION (ML) INTRAVENOUS CONTINUOUS
Status: ACTIVE | OUTPATIENT
Start: 2023-10-02 | End: 2023-10-02

## 2023-10-02 RX ORDER — LIDOCAINE HYDROCHLORIDE AND EPINEPHRINE 15; 5 MG/ML; UG/ML
INJECTION, SOLUTION EPIDURAL AS NEEDED
Status: DISCONTINUED | OUTPATIENT
Start: 2023-10-02 | End: 2023-10-02 | Stop reason: SURG

## 2023-10-02 RX ORDER — NALBUPHINE HYDROCHLORIDE 10 MG/ML
3 INJECTION, SOLUTION INTRAMUSCULAR; INTRAVENOUS; SUBCUTANEOUS EVERY 4 HOURS PRN
Status: ACTIVE | OUTPATIENT
Start: 2023-10-02 | End: 2023-10-03

## 2023-10-02 RX ORDER — IBUPROFEN 600 MG/1
600 TABLET ORAL EVERY 6 HOURS PRN
Status: DISCONTINUED | OUTPATIENT
Start: 2023-10-02 | End: 2023-10-02 | Stop reason: HOSPADM

## 2023-10-02 RX ORDER — OXYTOCIN 10 [USP'U]/ML
INJECTION, SOLUTION INTRAMUSCULAR; INTRAVENOUS AS NEEDED
Status: DISCONTINUED | OUTPATIENT
Start: 2023-10-02 | End: 2023-10-02 | Stop reason: SURG

## 2023-10-02 RX ORDER — CEFAZOLIN SODIUM 2 G/100ML
INJECTION, SOLUTION INTRAVENOUS
Status: COMPLETED
Start: 2023-10-02 | End: 2023-10-02

## 2023-10-02 RX ORDER — SODIUM CHLORIDE, SODIUM LACTATE, POTASSIUM CHLORIDE, CALCIUM CHLORIDE 600; 310; 30; 20 MG/100ML; MG/100ML; MG/100ML; MG/100ML
INJECTION, SOLUTION INTRAVENOUS CONTINUOUS PRN
Status: DISCONTINUED | OUTPATIENT
Start: 2023-10-02 | End: 2023-10-02 | Stop reason: SURG

## 2023-10-02 RX ORDER — ONDANSETRON 2 MG/ML
4 INJECTION INTRAMUSCULAR; INTRAVENOUS ONCE AS NEEDED
Status: DISCONTINUED | OUTPATIENT
Start: 2023-10-02 | End: 2023-10-02 | Stop reason: HOSPADM

## 2023-10-02 RX ORDER — SIMETHICONE 80 MG
80 TABLET,CHEWABLE ORAL 4 TIMES DAILY PRN
Status: DISCONTINUED | OUTPATIENT
Start: 2023-10-02 | End: 2023-10-05 | Stop reason: HOSPADM

## 2023-10-02 RX ORDER — SODIUM CHLORIDE 0.9 % (FLUSH) 0.9 %
10 SYRINGE (ML) INJECTION EVERY 12 HOURS SCHEDULED
Status: DISCONTINUED | OUTPATIENT
Start: 2023-10-02 | End: 2023-10-05 | Stop reason: HOSPADM

## 2023-10-02 RX ORDER — FAMOTIDINE 10 MG/ML
20 INJECTION, SOLUTION INTRAVENOUS ONCE AS NEEDED
Status: COMPLETED | OUTPATIENT
Start: 2023-10-02 | End: 2023-10-02

## 2023-10-02 RX ORDER — FAMOTIDINE 10 MG/ML
INJECTION, SOLUTION INTRAVENOUS AS NEEDED
Status: DISCONTINUED | OUTPATIENT
Start: 2023-10-02 | End: 2023-10-02 | Stop reason: SURG

## 2023-10-02 RX ORDER — ONDANSETRON 4 MG/1
4 TABLET, FILM COATED ORAL EVERY 8 HOURS PRN
Status: DISCONTINUED | OUTPATIENT
Start: 2023-10-02 | End: 2023-10-05 | Stop reason: HOSPADM

## 2023-10-02 RX ORDER — NALBUPHINE HYDROCHLORIDE 10 MG/ML
2.5 INJECTION, SOLUTION INTRAMUSCULAR; INTRAVENOUS; SUBCUTANEOUS ONCE AS NEEDED
Status: CANCELLED | OUTPATIENT
Start: 2023-10-02

## 2023-10-02 RX ORDER — SODIUM CHLORIDE 0.9 % (FLUSH) 0.9 %
10 SYRINGE (ML) INJECTION AS NEEDED
Status: DISCONTINUED | OUTPATIENT
Start: 2023-10-02 | End: 2023-10-02 | Stop reason: HOSPADM

## 2023-10-02 RX ORDER — ACETAMINOPHEN 325 MG/1
650 TABLET ORAL EVERY 4 HOURS PRN
Status: DISCONTINUED | OUTPATIENT
Start: 2023-10-02 | End: 2023-10-02 | Stop reason: HOSPADM

## 2023-10-02 RX ORDER — PRENATAL VIT/IRON FUM/FOLIC AC 27MG-0.8MG
1 TABLET ORAL DAILY
Status: DISCONTINUED | OUTPATIENT
Start: 2023-10-03 | End: 2023-10-05 | Stop reason: HOSPADM

## 2023-10-02 RX ORDER — ACETAMINOPHEN 500 MG
1000 TABLET ORAL EVERY 6 HOURS
Status: COMPLETED | OUTPATIENT
Start: 2023-10-03 | End: 2023-10-03

## 2023-10-02 RX ORDER — ROPIVACAINE HYDROCHLORIDE 2 MG/ML
15 INJECTION, SOLUTION EPIDURAL; INFILTRATION; PERINEURAL CONTINUOUS
Status: DISCONTINUED | OUTPATIENT
Start: 2023-10-02 | End: 2023-10-05

## 2023-10-02 RX ORDER — ACETAMINOPHEN 325 MG/1
650 TABLET ORAL EVERY 6 HOURS
Status: DISCONTINUED | OUTPATIENT
Start: 2023-10-04 | End: 2023-10-05 | Stop reason: HOSPADM

## 2023-10-02 RX ORDER — HYDROCORTISONE 25 MG/G
1 CREAM TOPICAL AS NEEDED
Status: DISCONTINUED | OUTPATIENT
Start: 2023-10-02 | End: 2023-10-05 | Stop reason: HOSPADM

## 2023-10-02 RX ORDER — SODIUM CHLORIDE 9 MG/ML
40 INJECTION, SOLUTION INTRAVENOUS AS NEEDED
Status: DISCONTINUED | OUTPATIENT
Start: 2023-10-02 | End: 2023-10-05 | Stop reason: HOSPADM

## 2023-10-02 RX ORDER — DOCUSATE SODIUM 100 MG/1
100 CAPSULE, LIQUID FILLED ORAL 2 TIMES DAILY PRN
Status: DISCONTINUED | OUTPATIENT
Start: 2023-10-02 | End: 2023-10-05 | Stop reason: HOSPADM

## 2023-10-02 RX ORDER — HYDROCODONE BITARTRATE AND ACETAMINOPHEN 5; 325 MG/1; MG/1
1 TABLET ORAL EVERY 4 HOURS PRN
Status: DISCONTINUED | OUTPATIENT
Start: 2023-10-02 | End: 2023-10-02 | Stop reason: HOSPADM

## 2023-10-02 RX ORDER — OXYCODONE HYDROCHLORIDE 5 MG/1
5 TABLET ORAL EVERY 4 HOURS PRN
Status: DISCONTINUED | OUTPATIENT
Start: 2023-10-02 | End: 2023-10-05 | Stop reason: HOSPADM

## 2023-10-02 RX ORDER — METHYLERGONOVINE MALEATE 0.2 MG/ML
INJECTION INTRAVENOUS AS NEEDED
Status: DISCONTINUED | OUTPATIENT
Start: 2023-10-02 | End: 2023-10-02 | Stop reason: SURG

## 2023-10-02 RX ORDER — PROMETHAZINE HYDROCHLORIDE 25 MG/1
25 TABLET ORAL EVERY 6 HOURS PRN
Status: DISCONTINUED | OUTPATIENT
Start: 2023-10-02 | End: 2023-10-05 | Stop reason: HOSPADM

## 2023-10-02 RX ORDER — OXYCODONE HYDROCHLORIDE 10 MG/1
10 TABLET ORAL EVERY 4 HOURS PRN
Status: DISCONTINUED | OUTPATIENT
Start: 2023-10-02 | End: 2023-10-05 | Stop reason: HOSPADM

## 2023-10-02 RX ORDER — METOCLOPRAMIDE HYDROCHLORIDE 5 MG/ML
INJECTION INTRAMUSCULAR; INTRAVENOUS AS NEEDED
Status: DISCONTINUED | OUTPATIENT
Start: 2023-10-02 | End: 2023-10-02 | Stop reason: SURG

## 2023-10-02 RX ORDER — DIPHENHYDRAMINE HYDROCHLORIDE 50 MG/ML
12.5 INJECTION INTRAMUSCULAR; INTRAVENOUS EVERY 8 HOURS PRN
Status: DISCONTINUED | OUTPATIENT
Start: 2023-10-02 | End: 2023-10-02 | Stop reason: HOSPADM

## 2023-10-02 RX ORDER — CALCIUM CARBONATE 500 MG/1
1 TABLET, CHEWABLE ORAL EVERY 4 HOURS PRN
Status: DISCONTINUED | OUTPATIENT
Start: 2023-10-02 | End: 2023-10-05 | Stop reason: HOSPADM

## 2023-10-02 RX ORDER — OXYTOCIN/0.9 % SODIUM CHLORIDE 30/500 ML
125 PLASTIC BAG, INJECTION (ML) INTRAVENOUS CONTINUOUS PRN
Status: DISCONTINUED | OUTPATIENT
Start: 2023-10-02 | End: 2023-10-05 | Stop reason: HOSPADM

## 2023-10-02 RX ORDER — CARBOPROST TROMETHAMINE 250 UG/ML
250 INJECTION, SOLUTION INTRAMUSCULAR AS NEEDED
Status: DISCONTINUED | OUTPATIENT
Start: 2023-10-02 | End: 2023-10-05 | Stop reason: HOSPADM

## 2023-10-02 RX ORDER — PROMETHAZINE HYDROCHLORIDE 12.5 MG/1
12.5 SUPPOSITORY RECTAL EVERY 6 HOURS PRN
Status: DISCONTINUED | OUTPATIENT
Start: 2023-10-02 | End: 2023-10-05 | Stop reason: HOSPADM

## 2023-10-02 RX ORDER — OXYTOCIN/0.9 % SODIUM CHLORIDE 30/500 ML
999 PLASTIC BAG, INJECTION (ML) INTRAVENOUS ONCE
Status: DISCONTINUED | OUTPATIENT
Start: 2023-10-02 | End: 2023-10-05 | Stop reason: HOSPADM

## 2023-10-02 RX ORDER — LIDOCAINE HCL/EPINEPHRINE/PF 2%-1:200K
VIAL (ML) INJECTION AS NEEDED
Status: DISCONTINUED | OUTPATIENT
Start: 2023-10-02 | End: 2023-10-02 | Stop reason: SURG

## 2023-10-02 RX ORDER — METHYLERGONOVINE MALEATE 0.2 MG/ML
INJECTION INTRAVENOUS
Status: COMPLETED
Start: 2023-10-02 | End: 2023-10-02

## 2023-10-02 RX ORDER — IBUPROFEN 600 MG/1
600 TABLET ORAL EVERY 6 HOURS
Status: DISCONTINUED | OUTPATIENT
Start: 2023-10-04 | End: 2023-10-05 | Stop reason: HOSPADM

## 2023-10-02 RX ORDER — SODIUM CHLORIDE 0.9 % (FLUSH) 0.9 %
1-10 SYRINGE (ML) INJECTION AS NEEDED
Status: DISCONTINUED | OUTPATIENT
Start: 2023-10-02 | End: 2023-10-05 | Stop reason: HOSPADM

## 2023-10-02 RX ORDER — ONDANSETRON 2 MG/ML
INJECTION INTRAMUSCULAR; INTRAVENOUS AS NEEDED
Status: DISCONTINUED | OUTPATIENT
Start: 2023-10-02 | End: 2023-10-02 | Stop reason: SURG

## 2023-10-02 RX ORDER — HYDROMORPHONE HYDROCHLORIDE 1 MG/ML
0.5 INJECTION, SOLUTION INTRAMUSCULAR; INTRAVENOUS; SUBCUTANEOUS
Status: CANCELLED | OUTPATIENT
Start: 2023-10-02 | End: 2023-10-03

## 2023-10-02 RX ORDER — ALUMINA, MAGNESIA, AND SIMETHICONE 2400; 2400; 240 MG/30ML; MG/30ML; MG/30ML
15 SUSPENSION ORAL EVERY 4 HOURS PRN
Status: DISCONTINUED | OUTPATIENT
Start: 2023-10-02 | End: 2023-10-05 | Stop reason: HOSPADM

## 2023-10-02 RX ORDER — KETOROLAC TROMETHAMINE 30 MG/ML
30 INJECTION, SOLUTION INTRAMUSCULAR; INTRAVENOUS ONCE AS NEEDED
Status: COMPLETED | OUTPATIENT
Start: 2023-10-02 | End: 2023-10-02

## 2023-10-02 RX ORDER — MISOPROSTOL 200 UG/1
800 TABLET ORAL AS NEEDED
Status: DISCONTINUED | OUTPATIENT
Start: 2023-10-02 | End: 2023-10-02 | Stop reason: HOSPADM

## 2023-10-02 RX ORDER — FENTANYL CITRATE 50 UG/ML
25 INJECTION, SOLUTION INTRAMUSCULAR; INTRAVENOUS
Status: DISCONTINUED | OUTPATIENT
Start: 2023-10-02 | End: 2023-10-02

## 2023-10-02 RX ORDER — METHYLERGONOVINE MALEATE 0.2 MG/ML
200 INJECTION INTRAVENOUS ONCE AS NEEDED
Status: DISCONTINUED | OUTPATIENT
Start: 2023-10-02 | End: 2023-10-02 | Stop reason: HOSPADM

## 2023-10-02 RX ORDER — CARBOPROST TROMETHAMINE 250 UG/ML
250 INJECTION, SOLUTION INTRAMUSCULAR AS NEEDED
Status: DISCONTINUED | OUTPATIENT
Start: 2023-10-02 | End: 2023-10-02 | Stop reason: HOSPADM

## 2023-10-02 RX ADMIN — SODIUM CHLORIDE, POTASSIUM CHLORIDE, SODIUM LACTATE AND CALCIUM CHLORIDE 999 ML/HR: 600; 310; 30; 20 INJECTION, SOLUTION INTRAVENOUS at 13:28

## 2023-10-02 RX ADMIN — SODIUM CHLORIDE, POTASSIUM CHLORIDE, SODIUM LACTATE AND CALCIUM CHLORIDE: 600; 310; 30; 20 INJECTION, SOLUTION INTRAVENOUS at 19:09

## 2023-10-02 RX ADMIN — LIDOCAINE HYDROCHLORIDE AND EPINEPHRINE 6 ML: 20; 5 INJECTION, SOLUTION EPIDURAL; INFILTRATION; INTRACAUDAL; PERINEURAL at 19:15

## 2023-10-02 RX ADMIN — MORPHINE SULFATE 4 MG: 0.5 INJECTION, SOLUTION EPIDURAL; INTRATHECAL; INTRAVENOUS at 19:42

## 2023-10-02 RX ADMIN — SODIUM CHLORIDE, POTASSIUM CHLORIDE, SODIUM LACTATE AND CALCIUM CHLORIDE 125 ML/HR: 600; 310; 30; 20 INJECTION, SOLUTION INTRAVENOUS at 04:20

## 2023-10-02 RX ADMIN — SODIUM CHLORIDE, POTASSIUM CHLORIDE, SODIUM LACTATE AND CALCIUM CHLORIDE 125 ML/HR: 600; 310; 30; 20 INJECTION, SOLUTION INTRAVENOUS at 08:22

## 2023-10-02 RX ADMIN — FENTANYL CITRATE 25 MCG: 50 INJECTION, SOLUTION INTRAMUSCULAR; INTRAVENOUS at 02:00

## 2023-10-02 RX ADMIN — FENTANYL CITRATE 100 MCG: 50 INJECTION, SOLUTION INTRAMUSCULAR; INTRAVENOUS at 08:25

## 2023-10-02 RX ADMIN — FENTANYL CITRATE 25 MCG: 50 INJECTION, SOLUTION INTRAMUSCULAR; INTRAVENOUS at 00:25

## 2023-10-02 RX ADMIN — SODIUM CHLORIDE, POTASSIUM CHLORIDE, SODIUM LACTATE AND CALCIUM CHLORIDE 1000 ML: 600; 310; 30; 20 INJECTION, SOLUTION INTRAVENOUS at 07:42

## 2023-10-02 RX ADMIN — ONDANSETRON 4 MG: 2 INJECTION INTRAMUSCULAR; INTRAVENOUS at 19:14

## 2023-10-02 RX ADMIN — SODIUM BICARBONATE 1 ML: 84 INJECTION INTRAVENOUS at 18:50

## 2023-10-02 RX ADMIN — LIDOCAINE HYDROCHLORIDE AND EPINEPHRINE 10 ML: 20; 5 INJECTION, SOLUTION EPIDURAL; INFILTRATION; INTRACAUDAL; PERINEURAL at 18:50

## 2023-10-02 RX ADMIN — ROPIVACAINE HYDROCHLORIDE 10 ML: 5 INJECTION, SOLUTION EPIDURAL; INFILTRATION; PERINEURAL at 08:29

## 2023-10-02 RX ADMIN — FAMOTIDINE 20 MG: 10 INJECTION INTRAVENOUS at 19:14

## 2023-10-02 RX ADMIN — SODIUM CITRATE AND CITRIC ACID MONOHYDRATE 30 ML: 500; 334 SOLUTION ORAL at 19:05

## 2023-10-02 RX ADMIN — KETOROLAC TROMETHAMINE 30 MG: 30 INJECTION, SOLUTION INTRAMUSCULAR at 21:48

## 2023-10-02 RX ADMIN — ACETAMINOPHEN 1000 MG: 500 TABLET ORAL at 18:57

## 2023-10-02 RX ADMIN — OXYTOCIN 10 UNITS: 10 INJECTION, SOLUTION INTRAMUSCULAR; INTRAVENOUS at 19:31

## 2023-10-02 RX ADMIN — FAMOTIDINE 20 MG: 10 INJECTION, SOLUTION INTRAVENOUS at 14:34

## 2023-10-02 RX ADMIN — LIDOCAINE HYDROCHLORIDE AND EPINEPHRINE 3 ML: 15; 5 INJECTION, SOLUTION EPIDURAL at 08:22

## 2023-10-02 RX ADMIN — METOCLOPRAMIDE 10 MG: 5 INJECTION, SOLUTION INTRAMUSCULAR; INTRAVENOUS at 19:14

## 2023-10-02 RX ADMIN — AZITHROMYCIN 500 MG: 500 INJECTION, POWDER, LYOPHILIZED, FOR SOLUTION INTRAVENOUS at 19:02

## 2023-10-02 RX ADMIN — Medication 500 ML: at 19:31

## 2023-10-02 RX ADMIN — CEFAZOLIN SODIUM 2 G: 2 INJECTION, SOLUTION INTRAVENOUS at 18:44

## 2023-10-02 RX ADMIN — METHYLERGONOVINE MALEATE 200 MCG: 0.2 INJECTION, SOLUTION INTRAMUSCULAR; INTRAVENOUS at 19:37

## 2023-10-02 RX ADMIN — MORPHINE SULFATE 1 MG: 0.5 INJECTION, SOLUTION EPIDURAL; INTRATHECAL; INTRAVENOUS at 19:45

## 2023-10-02 RX ADMIN — SODIUM CHLORIDE, POTASSIUM CHLORIDE, SODIUM LACTATE AND CALCIUM CHLORIDE 1000 ML: 600; 310; 30; 20 INJECTION, SOLUTION INTRAVENOUS at 21:48

## 2023-10-02 RX ADMIN — ROPIVACAINE HYDROCHLORIDE 15 ML/HR: 2 INJECTION, SOLUTION EPIDURAL; INFILTRATION; PERINEURAL at 08:30

## 2023-10-02 NOTE — PROCEDURES
23 y.o.  OB History          1    Para   0    Term   0       0    AB   0    Living   0         SAB   0    IAB   0    Ectopic   0    Molar   0    Multiple   0    Live Births   0             Presents at 39 1/7 weeks as an induction of labour due to Unfavourable cervix   Her primary OB requests a Cui Bulb placement to initiate the induction of labour.    Fetal Heart Rate Assessment   Method: Fetal HR Assessment Method: external   Beats/min: Fetal HR (beats/min): 130   Baseline: Fetal HR Baseline: normal range   Varibility: Fetal HR Variability: moderate (amplitude range 6 to 25 bpm)   Accels: Fetal HR Accelerations: absent   Decels: Fetal HR Decelerations: absent   Tracing Category:       TOCO:  None   SVE: FT/60/-3    A Cui Bulb was placed without difficulties with 60 cc of sterile saline.  The patient tolerated the procedure well.

## 2023-10-02 NOTE — ANESTHESIA PROCEDURE NOTES
Labor Epidural      Patient reassessed immediately prior to procedure    Patient location during procedure: floor  Performed By  CRNA/DENICE: Melissa Proctor CRNA  Preanesthetic Checklist  Completed: patient identified, IV checked, site marked, risks and benefits discussed, surgical consent, monitors and equipment checked, pre-op evaluation and timeout performed  Prep:  Pt Position:sitting  Sterile Tech:cap, gloves, mask and sterile barrier  Prep:DuraPrep  Monitoring:blood pressure monitoring  Epidural Block Procedure:  Approach:midline  Guidance:landmark technique and palpation technique  Needle Type:Tuohy  Needle Gauge:17 G  Loss of Resistance Medium: air  Loss of Resistance: 5cm  Cath Depth at skin:10 cm  Paresthesia: none  Aspiration:negative  Test Dose:negative  Number of Attempts: 1  Post Assessment:  Dressing:occlusive dressing applied and secured with tape  Pt Tolerance:patient tolerated the procedure well with no apparent complications  Complications:no

## 2023-10-02 NOTE — ANESTHESIA PREPROCEDURE EVALUATION
Anesthesia Evaluation     NPO Solid Status: > 6 hours  NPO Liquid Status: < 2 hours           Airway   Mallampati: II  TM distance: >3 FB  Neck ROM: full  Dental      Pulmonary    Cardiovascular         Neuro/Psych  GI/Hepatic/Renal/Endo      Musculoskeletal     Abdominal    Substance History      OB/GYN    (+) Pregnant        Other                      Anesthesia Plan    ASA 2     epidural       Anesthetic plan, risks, benefits, and alternatives have been provided, discussed and informed consent has been obtained with: patient.    Plan discussed with attending.    CODE STATUS:    Level Of Support Discussed With: Patient  Code Status (Patient has no pulse and is not breathing): CPR (Attempt to Resuscitate)  Medical Interventions (Patient has pulse or is breathing): Full Support

## 2023-10-02 NOTE — PLAN OF CARE
Problem: Adult Inpatient Plan of Care  Goal: Plan of Care Review  10/2/2023 1911 by Sierra Palmer RN  Outcome: Ongoing, Progressing  10/2/2023 1911 by Sierra Palmer RN  Outcome: Ongoing, Progressing  Goal: Patient-Specific Goal (Individualized)  10/2/2023 1911 by Sierra Palmer RN  Outcome: Ongoing, Progressing  10/2/2023 1911 by Sierra Palmer RN  Outcome: Ongoing, Progressing  Goal: Absence of Hospital-Acquired Illness or Injury  10/2/2023 1911 by Sierra Palmer RN  Outcome: Ongoing, Progressing  10/2/2023 1911 by Sierra Palmer RN  Outcome: Ongoing, Progressing  Intervention: Identify and Manage Fall Risk  Recent Flowsheet Documentation  Taken 10/2/2023 1823 by Sierra Palmer RN  Safety Promotion/Fall Prevention:   assistive device/personal items within reach   clutter free environment maintained   room organization consistent   safety round/check completed  Taken 10/2/2023 1739 by Sierra Palmer RN  Safety Promotion/Fall Prevention:   assistive device/personal items within reach   clutter free environment maintained   room organization consistent   safety round/check completed  Taken 10/2/2023 1644 by Sierra Palmer RN  Safety Promotion/Fall Prevention:   assistive device/personal items within reach   clutter free environment maintained   room organization consistent   safety round/check completed  Taken 10/2/2023 1535 by Sierra Palmer RN  Safety Promotion/Fall Prevention:   assistive device/personal items within reach   clutter free environment maintained   room organization consistent   safety round/check completed  Taken 10/2/2023 1420 by Sierra Palmer RN  Safety Promotion/Fall Prevention:   assistive device/personal items within reach   clutter free environment maintained   room organization consistent   safety round/check completed  Taken 10/2/2023 1250 by Sierra Palmer RN  Safety Promotion/Fall Prevention:   assistive device/personal items within reach   clutter free environment  maintained   room organization consistent   safety round/check completed  Intervention: Prevent and Manage VTE (Venous Thromboembolism) Risk  Recent Flowsheet Documentation  Taken 10/2/2023 1250 by Sierra Palmer RN  Activity Management: (d/t epidural) bedrest  Intervention: Prevent Infection  Recent Flowsheet Documentation  Taken 10/2/2023 1823 by Sierra Palmer RN  Infection Prevention: rest/sleep promoted  Taken 10/2/2023 1739 by Sierra Palmer RN  Infection Prevention: rest/sleep promoted  Taken 10/2/2023 1644 by Sierra Palmer RN  Infection Prevention: rest/sleep promoted  Taken 10/2/2023 1535 by Sierra Palmer RN  Infection Prevention: rest/sleep promoted  Taken 10/2/2023 1420 by Sierra Palmer RN  Infection Prevention: rest/sleep promoted  Taken 10/2/2023 1250 by Sierra Palmer RN  Infection Prevention: rest/sleep promoted  Goal: Optimal Comfort and Wellbeing  10/2/2023 1911 by Sierra Palmer RN  Outcome: Ongoing, Progressing  10/2/2023 1911 by Sierra Palmer RN  Outcome: Ongoing, Progressing  Intervention: Provide Person-Centered Care  Recent Flowsheet Documentation  Taken 10/2/2023 1823 by Sierra Palmer RN  Trust Relationship/Rapport:   care explained   choices provided   emotional support provided   empathic listening provided   questions answered   questions encouraged   reassurance provided   thoughts/feelings acknowledged  Taken 10/2/2023 1739 by Sierra Palmer RN  Trust Relationship/Rapport:   care explained   choices provided   emotional support provided   empathic listening provided   questions answered   questions encouraged   reassurance provided   thoughts/feelings acknowledged  Taken 10/2/2023 1644 by Sierra Palmer RN  Trust Relationship/Rapport:   care explained   emotional support provided   empathic listening provided  Taken 10/2/2023 1535 by Sierra Palmer RN  Trust Relationship/Rapport:   care explained   emotional support provided   empathic listening provided  Taken  10/2/2023 1420 by Sierra Palmer RN  Trust Relationship/Rapport:   care explained   choices provided   emotional support provided   empathic listening provided  Taken 10/2/2023 1250 by Sierra Palmer RN  Trust Relationship/Rapport:   care explained   choices provided   emotional support provided   empathic listening provided   questions answered   questions encouraged   thoughts/feelings acknowledged  Goal: Readiness for Transition of Care  10/2/2023 1911 by Sierra Palmer RN  Outcome: Ongoing, Progressing  10/2/2023 1911 by Sierra Palmer RN  Outcome: Ongoing, Progressing     Problem: Bleeding (Labor)  Goal: Hemostasis  Outcome: Ongoing, Progressing     Problem: Change in Fetal Wellbeing (Labor)  Goal: Stable Fetal Wellbeing  Outcome: Ongoing, Progressing     Problem: Delayed Labor Progression (Labor)  Goal: Effective Progression to Delivery  Outcome: Ongoing, Progressing     Problem: Infection (Labor)  Goal: Absence of Infection Signs and Symptoms  Outcome: Ongoing, Progressing  Intervention: Prevent or Manage Infection  Recent Flowsheet Documentation  Taken 10/2/2023 1823 by Sierra Palmer RN  Infection Prevention: rest/sleep promoted  Taken 10/2/2023 1739 by Sierra Palmer RN  Infection Prevention: rest/sleep promoted  Taken 10/2/2023 1644 by Sierra Palmer RN  Infection Prevention: rest/sleep promoted  Taken 10/2/2023 1535 by Sierra Palmer RN  Infection Prevention: rest/sleep promoted  Taken 10/2/2023 1420 by Sierra Palmer RN  Infection Prevention: rest/sleep promoted  Taken 10/2/2023 1250 by Sierra Palmer RN  Infection Prevention: rest/sleep promoted     Problem: Labor Pain (Labor)  Goal: Acceptable Pain Control  Outcome: Ongoing, Progressing     Problem: Uterine Tachysystole (Labor)  Goal: Normal Uterine Contraction Pattern  Outcome: Ongoing, Progressing   Goal Outcome Evaluation:

## 2023-10-02 NOTE — H&P
History and Physical:    Subjective     Chief Complaint   Patient presents with    Scheduled Induction       Elaina Roseanna Pena is a 23 y.o. year old  with an Estimated Date of Delivery: 10/7/23 currently at 39w2d presenting with no complaints.  Patient here for elective induction of labor.    Prenatal care has been with Anita Flanagan MD.  It has been significant for  THC use in pregnancy. .      Review of Systems   Constitutional: Negative.    HENT: Negative.     Respiratory: Negative.     Cardiovascular: Negative.    Gastrointestinal: Negative.    Genitourinary: Negative.    Musculoskeletal: Negative.    Skin: Negative.    Allergic/Immunologic: Negative.    Neurological: Negative.    Hematological: Negative.    Psychiatric/Behavioral: Negative.           History reviewed. No pertinent past medical history.  Past Surgical History:   Procedure Laterality Date    WISDOM TOOTH EXTRACTION       Family History   Problem Relation Age of Onset    Breast cancer Neg Hx     Ovarian cancer Neg Hx     Uterine cancer Neg Hx     Colon cancer Neg Hx      Social History     Tobacco Use    Smoking status: Former     Types: Electronic Cigarette     Start date: 10/15/2019     Quit date: 3/16/2023     Years since quittin.5     Passive exposure: Past    Tobacco comments:     currently trying to quit   Vaping Use    Vaping Use: Former    Substances: Nicotine    Devices: Disposable   Substance Use Topics    Alcohol use: Not Currently    Drug use: Yes     Types: Marijuana     Comment: pt states last use May 2023     Medications Prior to Admission   Medication Sig Dispense Refill Last Dose    Ferrous Sulfate (IRON PO) Take  by mouth.   2023    prenatal vitamin (prenatal, CLASSIC, vitamin) tablet Take  by mouth Daily.   2023     Allergies:  Patient has no known allergies.  OB History    Para Term  AB Living   1 0 0 0 0 0   SAB IAB Ectopic Molar Multiple Live Births   0 0 0 0 0 0      # Outcome  "Date GA Lbr Arvind/2nd Weight Sex Delivery Anes PTL Lv   1 Current                      Objective     /66   Pulse 77   Temp 98.4 °F (36.9 °C) (Oral)   Resp 20   Ht 170.2 cm (67\")   Wt 90.3 kg (199 lb)   LMP 12/28/2022   Breastfeeding Yes   BMI 31.17 kg/m²     Physical Exam    General:  No acute distress   Lung: Clear to auscultation bilaterally, no wheezing, clear at bases   Heart: Regular rate and rhythm, no murmurs    Abdomen: Gravid, nontender   Extremities: 2+ DTR's bilaterally, no edema   FHT's: reactive    Cervix: 5/70/-2.  AROM for clear fluid.  IUPC placed without difficulty.   [place]: Contraction are every 2 to 4           Lab Review   External Prenatal Results       Pregnancy Outside Results - Transcribed From Office Records - See Scanned Records For Details       Test Value Date Time    ABO  O  10/01/23 2156    Rh  Positive  10/01/23 2156    Antibody Screen  Negative  10/01/23 2123       Negative  07/17/23 1407       Negative  02/27/23     Varicella IgG       Rubella  3.07 index 02/27/23     Hgb  12.2 g/dL 10/01/23 2123       11.2 g/dL 07/17/23 1407       13.2 g/dL 02/27/23     Hct  36.9 % 10/01/23 2123       34.8 % 07/17/23 1407       39.9 % 02/27/23     Glucose Fasting GTT       Glucose Tolerance Test 1 hour       Glucose Tolerance Test 3 hour       Gonorrhea (discrete)  Negative  02/27/23     Chlamydia (discrete)  Negative  02/27/23     RPR  Non Reactive  02/27/23     VDRL       Syphilis Antibody       HBsAg  Negative  02/27/23     Herpes Simplex Virus PCR       Herpes Simplex VIrus Culture       HIV  Non Reactive  02/27/23     Hep C RNA Quant PCR       Hep C Antibody  Non Reactive  02/27/23     AFP       Group B Strep  No Group B Streptococcus isolated  09/07/23 1831    GBS Susceptibility to Clindamycin       GBS Susceptibility to Erythromycin       Fetal Fibronectin       Genetic Testing, Maternal Blood                 Drug Screening       Test Value Date Time    Urine Drug Screen       " Amphetamine Screen  Negative  10/01/23 2123       Negative ng/mL 07/18/23        Negative ng/mL 06/29/23 1049       Negative ng/mL 06/29/23        Negative ng/mL 05/22/23        Negative ng/mL 02/27/23     Barbiturate Screen  Negative  10/01/23 2123       Negative ng/mL 07/18/23        Negative ng/mL 06/29/23 1049       Negative ng/mL 06/29/23        Negative ng/mL 05/22/23        Negative ng/mL 02/27/23     Benzodiazepine Screen  Negative  10/01/23 2123       Negative ng/mL 07/18/23        Negative ng/mL 06/29/23 1049       Negative ng/mL 06/29/23        Negative ng/mL 05/22/23        Negative ng/mL 02/27/23     Methadone Screen  Negative  10/01/23 2123       Negative ng/mL 07/18/23        Negative ng/mL 06/29/23 1049       Negative ng/mL 06/29/23        Negative ng/mL 05/22/23        Negative ng/mL 02/27/23     Phencyclidine Screen  Negative  10/01/23 2123       Negative ng/mL 07/18/23        Negative ng/mL 06/29/23 1049       Negative ng/mL 06/29/23        Negative ng/mL 05/22/23        Negative ng/mL 02/27/23     Opiates Screen  Negative  10/01/23 2123    THC Screen  Positive  10/01/23 2123    Cocaine Screen       Propoxyphene Screen  Negative  10/01/23 2123       Negative ng/mL 07/18/23        Negative ng/mL 06/29/23 1049       Negative ng/mL 06/29/23        Negative ng/mL 05/22/23        Negative ng/mL 02/27/23     Buprenorphine Screen  Negative  10/01/23 2123    Methamphetamine Screen       Oxycodone Screen  Negative  10/01/23 2123    Tricyclic Antidepressants Screen  Negative  10/01/23 2123              Legend    ^: Historical                                Lab Results   Component Value Date    ALKPHOS 225 (H) 10/01/2023    ALT 11 10/01/2023    AST 26 10/01/2023    CREATININE 0.57 10/01/2023    BILITOT 0.4 10/01/2023     (H) 10/01/2023    URICACID 6.4 (H) 10/01/2023     Lab Results   Component Value Date    WBC 7.25 10/01/2023    HGB 12.2 10/01/2023    HCT 36.9 10/01/2023    MCV 82.0 10/01/2023    PLT  147 10/01/2023        Results for orders placed in visit on 06/19/23     Ob Follow Up Transabdominal Approach    Narrative  PAT NAME: DA MANZANARES  MED REC#: [id number]  BIRTH DA: 2000  PAT GEND: F  ACCOUNT#: [id number]  PAT TYPE: O  EXAM RHONDA: [id number]  REF PHYS JOAQUIM OSBORNE  ACCESSION [id number]      Sonographer Comments  ====================    mild right ventriculomegaly      Indication  ========    f/u brain; spine and nose lips      Comparison Studies  =================    The findings of this study are compared to the prior ultrasound study dated 5/22/2023      Pregnancy  =========    Lenz pregnancy. Number of fetuses: 1      Dating  ======    LMP on: 11/30/2022  Cycle: LMP date uncertain  GA by LMP 28 w + 5 d  HORACIO by LMP: 9/6/2023  Method of dating: based on stated HORACIO  GA by prior assessment 24 w + 2 d  HORACIO by prior assessment: 10/7/2023  Ultrasound examination on: 6/19/2023  GA by U/S based upon: AC, BPD, Femur, HC  GA by U/S 23 w + 1 d  HORACIO by U/S: 10/15/2023  Previous dating: based on stated HORACIO, selected on 05/22/2023  Agreed HORACIO of previous dating: 10/7/2023  Assigned: based on stated HORACIO, selected on 06/19/2023  Assigned GA 24 w + 2 d  Assigned HORACIO: 10/7/2023  Pregnancy length 280 d      General Evaluation  ================    Cardiac activity present.  bpm.  Fetal movements present.  Presentation breech.  Placenta Placental site: posterior.  Umbilical cord Cord vessels: undetermined.  Amniotic fluid Amount of AF: normal. MVP 2.5 cm.      Fetal Biometry  ============    Standard  BPD 53.9 mm  22w 3d        2%        Hadlock    .3 mm  23w 3d        9%        Hadlock    Cerebellum tr 24.4 mm  22w 2d        14%        Hill    Nuchal fold 5.3 mm  .0 mm  22w 4d        4%        Hadlock    Femur 43.7 mm  24w 2d        39%        Hadlock    HC / AC 1.22     g  28%        Florentino    EFW (lb) 1 lb  EFW (oz) 5 oz  EFW by: Hadlock (BPD-HC-AC-FL)  Extended   7.5  mm  CM 2.9 mm  <1%        Nicolaides    Extremities / Bony Struc  FL / BPD 0.81    FL / HC 0.20    FL / AC 0.25    Other Structures   bpm      Fetal Anatomy  ============    Lateral ventricles: details  Midline falx: Appears normal  Cavum septi pellucidi: Appears normal  Cerebellum: Appears normal  Cisterna magna: Appears normal  Head / Neck  Rt lateral ventricle: ABNORMAL  Rt lateral ventricle: ventriculomegaly 11mm  Lt lateral ventricle: Appears normal  Lips: Appear normal  Profile: Appears normal  Nose: Appears normal  4-chamber view: Appears normal  RVOT view: Appears normal  LVOT view: Appears normal  Stomach: Appears normal  Kidneys: Appears normal  Bladder: Appears normal  Cervical spine: Appears normal  Thoracic spine: Appears normal  Lumbar spine: Appears normal  Sacral spine: Appears normal  Gender: male  Wants to know gender: yes      Impression  ==========    Male fetus in breech presentation fetal heart rate 149. MVP normal at 2.5. Estimated fetal weight 1 pound 5 ounces which is 28th percentile. Of note AC is showing  IUGR status at 4th percentile. BPD is at 2nd percentile. Lateral ventricles are showing mild ventriculomegaly.      Recommendation  ===============    PDC evaluation.        Sonographer: JESSICA Bernard RT R, [facility]  Physician: Anita Flanagan MD, FACOG    Electronically signed by: Anita Flanagan MD, FACOG at:  13:32              Patient Active Problem List    Diagnosis Date Noted    *Pregnancy 10/01/2023    Maternal anemia in pregnancy, antepartum 2023     Note Last Updated: 2023     11.2      Poor fetal growth affecting management of mother in third trimester 2023     Note Last Updated: 9/15/2023     6/19/23-AC 4%, HC 9%, EFW 28%  2023-PDC evaluation showed AC-F 3rd percentile and normal growth.     PDC: Mild but improved AC lag currently 7th%, lateral ventricles appear wnl. With continued AC lag recommended weekly  testing until next growth  scan 8/18/23 8/18 SIUP is noted in cephalic presentation with biometry demonstrating stable but lagging growth. EFW overall measures at the 37th percentile. AC  measures at the 6th percentile. There is a normal amount of amniotic fluid. Placenta is posterior. BPP is 8/8. UA Dopplers are normal.  9/8/2023 baby measured 5 pounds 13 ounces which is 36 percentile.  AC was 12 percentile.  No further scans for PDC recommended.      History of abnormal cervical Pap smear 03/05/2023     Note Last Updated: 3/5/2023     2/27/2023-ASCUS, HPV negative.  Repeat 1 year      Mild tetrahydrocannabinol (THC) abuse 03/01/2023     Note Last Updated: 7/19/2023     Positive at new OB visit on 2/27/2023  Positive on 5/22/23  Repeat at 28 week visit-positive      Prenatal care, antepartum 02/27/2023     Note Last Updated: 9/27/2023     cfDNA negative.  Boy!    IOL 10/1 @ 5PM          Assessment & Plan     ASSESSMENT  IUP at 39w2d  induction of labor   3. GBBSnegative    PLAN  Admit to labor and delivery   2.  pitocin and stein bulb         Anita Flanagan MD  10/2/2023@

## 2023-10-03 LAB
BASOPHILS # BLD AUTO: 0.02 10*3/MM3 (ref 0–0.2)
BASOPHILS NFR BLD AUTO: 0.2 % (ref 0–1.5)
DEPRECATED RDW RBC AUTO: 39.2 FL (ref 37–54)
EOSINOPHIL # BLD AUTO: 0.02 10*3/MM3 (ref 0–0.4)
EOSINOPHIL NFR BLD AUTO: 0.2 % (ref 0.3–6.2)
ERYTHROCYTE [DISTWIDTH] IN BLOOD BY AUTOMATED COUNT: 13.1 % (ref 12.3–15.4)
HCT VFR BLD AUTO: 33.1 % (ref 34–46.6)
HGB BLD-MCNC: 10.8 G/DL (ref 12–15.9)
IMM GRANULOCYTES # BLD AUTO: 0.06 10*3/MM3 (ref 0–0.05)
IMM GRANULOCYTES NFR BLD AUTO: 0.5 % (ref 0–0.5)
LYMPHOCYTES # BLD AUTO: 1.3 10*3/MM3 (ref 0.7–3.1)
LYMPHOCYTES NFR BLD AUTO: 9.8 % (ref 19.6–45.3)
MCH RBC QN AUTO: 27.1 PG (ref 26.6–33)
MCHC RBC AUTO-ENTMCNC: 32.6 G/DL (ref 31.5–35.7)
MCV RBC AUTO: 83 FL (ref 79–97)
MONOCYTES # BLD AUTO: 0.85 10*3/MM3 (ref 0.1–0.9)
MONOCYTES NFR BLD AUTO: 6.4 % (ref 5–12)
NEUTROPHILS NFR BLD AUTO: 11 10*3/MM3 (ref 1.7–7)
NEUTROPHILS NFR BLD AUTO: 82.9 % (ref 42.7–76)
NRBC BLD AUTO-RTO: 0 /100 WBC (ref 0–0.2)
PLATELET # BLD AUTO: 127 10*3/MM3 (ref 140–450)
PMV BLD AUTO: 12.4 FL (ref 6–12)
RBC # BLD AUTO: 3.99 10*6/MM3 (ref 3.77–5.28)
WBC NRBC COR # BLD: 13.25 10*3/MM3 (ref 3.4–10.8)

## 2023-10-03 PROCEDURE — 25010000002 PROMETHAZINE PER 50 MG: Performed by: OBSTETRICS & GYNECOLOGY

## 2023-10-03 PROCEDURE — 0503F POSTPARTUM CARE VISIT: CPT

## 2023-10-03 PROCEDURE — 85025 COMPLETE CBC W/AUTO DIFF WBC: CPT | Performed by: OBSTETRICS & GYNECOLOGY

## 2023-10-03 PROCEDURE — 25010000002 KETOROLAC TROMETHAMINE PER 15 MG: Performed by: OBSTETRICS & GYNECOLOGY

## 2023-10-03 RX ADMIN — KETOROLAC TROMETHAMINE 15 MG: 15 INJECTION, SOLUTION INTRAMUSCULAR; INTRAVENOUS at 04:29

## 2023-10-03 RX ADMIN — PRENATAL VITAMINS-IRON FUMARATE 27 MG IRON-FOLIC ACID 0.8 MG TABLET 1 TABLET: at 09:15

## 2023-10-03 RX ADMIN — KETOROLAC TROMETHAMINE 15 MG: 15 INJECTION, SOLUTION INTRAMUSCULAR; INTRAVENOUS at 16:18

## 2023-10-03 RX ADMIN — ACETAMINOPHEN 1000 MG: 500 TABLET ORAL at 19:54

## 2023-10-03 RX ADMIN — Medication 1 APPLICATION: at 01:12

## 2023-10-03 RX ADMIN — ACETAMINOPHEN 1000 MG: 500 TABLET ORAL at 13:45

## 2023-10-03 RX ADMIN — ACETAMINOPHEN 1000 MG: 500 TABLET ORAL at 01:09

## 2023-10-03 RX ADMIN — ACETAMINOPHEN 1000 MG: 500 TABLET ORAL at 06:14

## 2023-10-03 RX ADMIN — KETOROLAC TROMETHAMINE 15 MG: 15 INJECTION, SOLUTION INTRAMUSCULAR; INTRAVENOUS at 21:45

## 2023-10-03 RX ADMIN — PROMETHAZINE HYDROCHLORIDE 12.5 MG: 25 INJECTION INTRAMUSCULAR; INTRAVENOUS at 01:09

## 2023-10-03 RX ADMIN — KETOROLAC TROMETHAMINE 15 MG: 15 INJECTION, SOLUTION INTRAMUSCULAR; INTRAVENOUS at 10:25

## 2023-10-03 NOTE — LACTATION NOTE
10/03/23 0928   Maternal Information   Date of Referral 10/03/23   Person Making Referral lactation consultant   Maternal Reason for Referral no prior breastfeeding experience   Infant Reason for Referral  infant   Maternal Assessment   Breast Shape Bilateral:;round   Breast Density Bilateral:;soft   Nipples Bilateral:;everted;short   Left Nipple Symptoms intact;nontender   Right Nipple Symptoms intact;nontender   Maternal Infant Feeding   Maternal Emotional State receptive   Infant Positioning clutch/football  (right)   Signs of Milk Transfer deep jaw excursions noted  (brief feeding, but did have deep jaw excursions while latched)   Pain with Feeding no   Comfort Measures Before/During Feeding infant position adjusted;other (see comments)  (medium nipple shield to start, provided with small shield as well.)   Latch Assistance full assistance needed;minimal assistance  (started with full assistance, MOB then able to do it herself)   Support Person Involvement verbally supports mother   Milk Expression/Equipment   Breast Pump Type double electric, personal  (medela)   Equipment for Home Use breast pump ordered through insurance;breast pump provided   Breast Pumping   Breast Pumping Interventions other (see comments)  (encouraged to pump for short or missed feedings and with supplementation)     Courtesy visit for newly postpartum couplet. Infant not in room at this time, MOB reports he is in nursery for glucose check. MOB reports that baby has not latched well and isn't maintaining latch. Educational handout provided and reviewed. Encouraged to call for latch check when infant is returned to room.    928 RN called out for LC to check. Assisted MOB positioning, sat up with pillows vertically behind back. Baby brought to breast in football on right. Baby not wanting to open mouth wide. Frenulum noted to be visible and tight. Will place SLP consult for further evaluation. MOB has medium nipple shield at  "bedside. Baby \"slurping\" shield into mouth. Baby did latch well for a few minutes with deep jaw excursions noted, but did not maintain latch. Removed shield to switch to small size, and baby attempted to latch without shield on his own. He latched well for a few more minutes, MOB denies pain with latch, baby did not maintain latch, nipple looks flattened when baby came off. Attempted to latch with small shield, but baby no longer willing to open mouth. Infant placed skin to skin with MOB and encouraged to try small shield the next feeding and to call for help as needed. Encouraged to call as needs arise.   "

## 2023-10-03 NOTE — PROGRESS NOTES
10/3/2023    Name:Elaina Pena    MR#:[id number]     PROGRESS NOTE:  Post-Op Day 1 S/P    HD:2    Subjective   23 y.o. yo Female  s/p CS at 39w2d doing well. Pain well controlled. Tolerating regular diet. Lochia normal. Has not voided or passed flatus.       Pregnancy    Delivery of pregnancy by  section        Objective    Vitals  Temp:  Temp:  [98.3 °F (36.8 °C)-99.8 °F (37.7 °C)] 98.8 °F (37.1 °C)  Temp src: Oral  BP:  BP: (102-151)/() 139/83  Pulse:  Heart Rate:  [] 88  RR:   Resp:  [14-20] 16    General Awake, alert, no distress  Abdomen Soft, non-distended, fundus firm, below umbilicus, appropriately tender  Incision  Intact, no erythema or exudate  Extremities Calves NT bilaterally     I/O last 3 completed shifts:  In: 1400 [I.V.:1400]  Out: 2699 [Urine:2150; Blood:549]    LABS:   Lab Results   Component Value Date    WBC 13.25 (H) 10/03/2023    HGB 10.8 (L) 10/03/2023    HCT 33.1 (L) 10/03/2023    MCV 83.0 10/03/2023     (L) 10/03/2023       Infant: male . Desires circ.       Assessment   1.  POD 1, PCS- Failure to progress: arrest of dilation.   2.  Anemic, Asymptomatic, VSS.    3.  Systolic BP high 130s. Asymptomatic, PEP 10/1 UA 6.4(elevated), Low- Alk Phos 225, , Plt 127.     Plan:    1.  Doing well.  Routine postoperative care. Advance.    2.  Continue to monitor for s/sx r/t preeclampsia.    3.  Implement PRN nursing orders if unable to void after 4-6 hrs.    4.  AM PEP ordered.           Active Problems:   None      Kamilla Jay, APRN  10/3/2023 09:49 EDT

## 2023-10-03 NOTE — ANESTHESIA POSTPROCEDURE EVALUATION
Patient: Elaina Pena    Procedure Summary       Date: 10/02/23 Room / Location: [facility] LABOR DELIVERY   [facility] LABOR [facility]IVERY    Anesthesia Start: 814 Anesthesia Stop:     Procedure:  SECTION PRIMARY (Abdomen) Diagnosis:     Surgeons: Anita Flanagan MD Provider: Saul Li DO    Anesthesia Type: epidural ASA Status: 2            Anesthesia Type: epidural    Vitals    /85  RR 18  SpO2 100%  T 99.0 F    Post Anesthesia Care and Evaluation    Patient location during evaluation: bedside  Patient participation: complete - patient participated  Level of consciousness: awake  Pain score: 0  Pain management: satisfactory to patient    Airway patency: patent  Anesthetic complications: No anesthetic complications  PONV Status: none  Cardiovascular status: acceptable and hemodynamically stable  Respiratory status: acceptable  Hydration status: acceptable

## 2023-10-03 NOTE — OP NOTE
[facility]   Section Operative Note    Pre-Operative Dx:   1.  IUP at 39w2d  weeks     2. Failure to progress: arrest of dilation        Postoperative dx:    1.  Same     Procedure: Procedure(s):   SECTION PRIMARY   Surgeon: Anita Flanagan MD    Assistant: Surgeon(s):  Anita Flanagan MD Allen, George Sea III, MD        Anesthesia: Epidural;Spinal    EBL:   mls.  <500ml mls.         IV Fluids: 1400 mls.   UOP: 50 mls.       Antibiotics: cefazolin (Ancef) and azithromycin     Infant:            Gender: male  infant    Weight: 3260 g (7 lb 3 oz)     Apgars:   @ 1 minute /       @ 5 minutes    Fetal presentation: cephalic   Amniotic fluid: Clear      Complications:   None      Disposition:   Mother to Mother Baby/Postpartum  in stable condition currently.   Baby to NICU  in stable condition currently.     Indication: Patient admitted to labor and delivery for an induction of labor.  Patient progressed to 6/70/-1 station.  Despite adequate contractions, she remained unchanged for greater than 6 hours.  Options alternatives risk benefits were discussed with the patient and she elected for out right  section.  Procedure:   The patient was taken to the operating room where spinal anesthesia was placed, and she was placed in supine position with a leftward tilt. Sequential compression devices on bilateral lower extremities and a Cui catheter placed in her bladder. After being prepped and draped in a normal sterile fashion, a Pfannenstiel skin incision was made with a scalpel and taken down to the level of the fascia using the Bovie. The fascia was incised in the midline and extended laterally. The superior edge of the fascia was grasped with Kocher clamps, elevated and the rectus muscle dissected off. In a similar fashion, the inferior edge of the fascia was grasped with Kocher clamps, elevated and the rectus muscles dissected off. The rectus muscles were bisected in the midline.  The peritoneum was identified, grasped and entered into sharply using Metzenbaum scissors. This incision was extended superiorly and inferiorly with good visualization of the bladder. Bladder blade was placed. A bladder flap was created. A low transverse uterine incision was made with a scalpel and extended laterally. Amniotomy with clear fluid occurred at the time of hysterotomy. The head was brought to the uterine incision, and using fundal pressure, the head delivered without complication. Nose and mouth were bulb suctioned.  Shoulders and body were to follow. Cord was milked x4, clamped x2 and cut. Infant was handed to the awaiting pediatric team.  Cord blood was obtained. The placenta was manually extracted. The uterus was exteriorized and cleared of all clot and debris and the hysterotomy site was closed with a 1-0 chromic in a running locked fashion starting at the left angle and moving towards the right. Copious irrigation behind the uterus ensued. The uterus was returned to the abdominal cavity. Paracolic gutters were cleared of all clot and debris. The hysterotomy site was noted to be hemostatic as well as the bladder flap and Interceed was placed over this. The peritoneum was reapproximated using a 2-0 chromic in a running fashion starting superiorly and moving inferiorly. Irrigation over the rectus muscles then occurred with Bovie cauterization of any bleeding sites. The fascia was reapproximated using a looped 0 PDS in a running fashion starting at the left angle and moving towards the right.  The subcutaneous fat layer was hemostatic and closed in an interrupted fashion with 2-0 Plain.  The skin was reapproximated with a 4-0 vicryl on a Boom needle. All sponge, lap, and needle counts were correct x2. The patient was taken to the recovery room in stable condition.     Assist: Dr. Walter Rios was necessary for the procedure to help with suction, retraction, aid in delivery of fetus.        Anita Welch  MD Masha  10/2/2023  20:15 EDT

## 2023-10-03 NOTE — ANESTHESIA POSTPROCEDURE EVALUATION
Patient: Elaina Pena    Procedure Summary       Date: 10/02/23 Room / Location: [facility] LABOR DELIVERY   [facility] LABOR DELIVERY    Anesthesia Start: 814 Anesthesia Stop:     Procedure:  SECTION PRIMARY (Abdomen) Diagnosis:     Surgeons: Anita Flanagan MD Provider: Saul Li DO    Anesthesia Type: epidural ASA Status: 2            Anesthesia Type: epidural    Vitals  Vitals Value Taken Time   /80 10/03/23 0420   Temp 99.5 °F (37.5 °C) 10/03/23 0420   Pulse 92 10/03/23 0420   Resp 18 10/03/23 0420   SpO2 98 % 10/02/23 2154   Vitals shown include unvalidated device data.        Post Anesthesia Care and Evaluation    Patient location during evaluation: bedside  Patient participation: complete - patient participated  Level of consciousness: awake and alert  Pain management: adequate    Airway patency: patent  Anesthetic complications: No anesthetic complications    Cardiovascular status: acceptable  Respiratory status: acceptable  Hydration status: acceptable  Post Neuraxial Block status: Motor and sensory function returned to baseline and No signs or symptoms of PDPH

## 2023-10-03 NOTE — CASE MANAGEMENT/SOCIAL WORK
Continued Stay Note  [facility]     Patient Name: Elaina Pena  MRN: [id number]  Today's Date: 10/3/2023    Admit Date: 10/1/2023    Plan: MSW available   Discharge Plan       Row Name 10/03/23 0707       Plan    Plan MSW available    Plan Comments Pt had + UDS for THC on 2/27/2023, 5/22/2023, 6/29/2023, 7/18/2023 and 10/1/2023. Awaiting cord stat results per policy    Final Discharge Disposition Code 01 - home or self-care                   Discharge Codes    No documentation.                       Willa Joseph MSW

## 2023-10-04 LAB
ALP SERPL-CCNC: 168 U/L (ref 39–117)
ALT SERPL W P-5'-P-CCNC: 10 U/L (ref 1–33)
AST SERPL-CCNC: 18 U/L (ref 1–32)
BILIRUB SERPL-MCNC: 0.3 MG/DL (ref 0–1.2)
CREAT SERPL-MCNC: 0.51 MG/DL (ref 0.57–1)
DEPRECATED RDW RBC AUTO: 40.1 FL (ref 37–54)
ERYTHROCYTE [DISTWIDTH] IN BLOOD BY AUTOMATED COUNT: 13.4 % (ref 12.3–15.4)
HCT VFR BLD AUTO: 30.2 % (ref 34–46.6)
HGB BLD-MCNC: 9.7 G/DL (ref 12–15.9)
LDH SERPL-CCNC: 236 U/L (ref 135–214)
MCH RBC QN AUTO: 26.6 PG (ref 26.6–33)
MCHC RBC AUTO-ENTMCNC: 32.1 G/DL (ref 31.5–35.7)
MCV RBC AUTO: 83 FL (ref 79–97)
PLATELET # BLD AUTO: 119 10*3/MM3 (ref 140–450)
PMV BLD AUTO: 11.7 FL (ref 6–12)
RBC # BLD AUTO: 3.64 10*6/MM3 (ref 3.77–5.28)
URATE SERPL-MCNC: 6.1 MG/DL (ref 2.4–5.7)
WBC NRBC COR # BLD: 11.51 10*3/MM3 (ref 3.4–10.8)

## 2023-10-04 PROCEDURE — 84550 ASSAY OF BLOOD/URIC ACID: CPT

## 2023-10-04 PROCEDURE — 84460 ALANINE AMINO (ALT) (SGPT): CPT

## 2023-10-04 PROCEDURE — 84450 TRANSFERASE (AST) (SGOT): CPT

## 2023-10-04 PROCEDURE — 0503F POSTPARTUM CARE VISIT: CPT

## 2023-10-04 PROCEDURE — 85027 COMPLETE CBC AUTOMATED: CPT

## 2023-10-04 PROCEDURE — 82247 BILIRUBIN TOTAL: CPT

## 2023-10-04 PROCEDURE — 82565 ASSAY OF CREATININE: CPT

## 2023-10-04 PROCEDURE — 83615 LACTATE (LD) (LDH) ENZYME: CPT

## 2023-10-04 PROCEDURE — 84075 ASSAY ALKALINE PHOSPHATASE: CPT

## 2023-10-04 RX ADMIN — IBUPROFEN 600 MG: 600 TABLET, FILM COATED ORAL at 23:37

## 2023-10-04 RX ADMIN — ACETAMINOPHEN 650 MG: 325 TABLET ORAL at 18:16

## 2023-10-04 RX ADMIN — IBUPROFEN 600 MG: 600 TABLET, FILM COATED ORAL at 16:16

## 2023-10-04 RX ADMIN — IBUPROFEN 600 MG: 600 TABLET, FILM COATED ORAL at 10:42

## 2023-10-04 RX ADMIN — ACETAMINOPHEN 650 MG: 325 TABLET ORAL at 00:53

## 2023-10-04 RX ADMIN — PRENATAL VITAMINS-IRON FUMARATE 27 MG IRON-FOLIC ACID 0.8 MG TABLET 1 TABLET: at 08:28

## 2023-10-04 RX ADMIN — OXYCODONE HYDROCHLORIDE 5 MG: 5 TABLET ORAL at 03:27

## 2023-10-04 RX ADMIN — IBUPROFEN 600 MG: 600 TABLET, FILM COATED ORAL at 05:50

## 2023-10-04 RX ADMIN — Medication 10 ML: at 08:29

## 2023-10-04 RX ADMIN — ACETAMINOPHEN 650 MG: 325 TABLET ORAL at 06:45

## 2023-10-04 RX ADMIN — DOCUSATE SODIUM 100 MG: 100 CAPSULE, LIQUID FILLED ORAL at 08:28

## 2023-10-04 NOTE — PROGRESS NOTES
10/4/2023    Name:Elaina Pena    MR#:[id number]     PROGRESS NOTE:  Post-Op Day 2 S/P    HD:3    Subjective   23 y.o. yo Female  s/p CS at 39w2d doing well. Pain well controlled. Tolerating regular diet and having flatus. Lochia normal.       Pregnancy    Delivery of pregnancy by  section        Objective    Vitals  Temp:  Temp:  [97.4 °F (36.3 °C)-98.7 °F (37.1 °C)] 98.4 °F (36.9 °C)  Temp src: Oral  BP:  BP: (118-144)/(71-86) 118/73  Pulse:  Heart Rate:  [81-90] 84  RR:   Resp:  [16-18] 16    General Awake, alert, no distress  Abdomen Soft, non-distended, fundus firm, below umbilicus, appropriately tender  Incision  Intact, no erythema or exudate  Extremities Calves NT bilaterally     I/O last 3 completed shifts:  In: 1400 [I.V.:1400]  Out: 2874 [Urine:2325; Blood:549]    LABS:   Lab Results   Component Value Date    WBC 11.51 (H) 10/04/2023    HGB 9.7 (L) 10/04/2023    HCT 30.2 (L) 10/04/2023    MCV 83.0 10/04/2023     (L) 10/04/2023       Infant: male . Desires circ. (NICU)                            Assessment   1.  POD 2, PCS- Failure to progress: arrest of dilation.   2.  Anemic, Asymptomatic, VSS.    3.  BP WNL. PEP labs- LDH, Uric Asid, Alk Phos improved. Plt decreased from 127 to 119.      Plan:    1.  Doing well.  Routine postoperative care. Advance.    2.  Continue to monitor for s/sx r/t bleeding.        Active Problems:   None      YAN Hairston  10/4/2023 10:58 EDT

## 2023-10-04 NOTE — LACTATION NOTE
Infant admitted to NICU during the night.  Mom was encouraged to pump but hasn't started yet.  Medela pump in room.  Offered to initiate mom on hospital pump but mom declined.  Offered to start pumping on personal medela but mom states she will do it in a little bit.  NICU pumping education given and reviewed with mom.  Encouraged sanitizing pump parts prior to use and daily after.

## 2023-10-05 VITALS
RESPIRATION RATE: 16 BRPM | TEMPERATURE: 97.4 F | BODY MASS INDEX: 31.23 KG/M2 | HEIGHT: 67 IN | HEART RATE: 81 BPM | DIASTOLIC BLOOD PRESSURE: 80 MMHG | WEIGHT: 199 LBS | OXYGEN SATURATION: 98 % | SYSTOLIC BLOOD PRESSURE: 131 MMHG

## 2023-10-05 PROBLEM — Z34.90 PREGNANCY: Status: RESOLVED | Noted: 2023-10-01 | Resolved: 2023-10-05

## 2023-10-05 RX ORDER — IBUPROFEN 600 MG/1
600 TABLET ORAL EVERY 6 HOURS
Qty: 60 TABLET | Refills: 1 | Status: SHIPPED | OUTPATIENT
Start: 2023-10-05

## 2023-10-05 RX ORDER — OXYCODONE HYDROCHLORIDE 5 MG/1
5 TABLET ORAL EVERY 4 HOURS PRN
Qty: 18 TABLET | Refills: 0 | Status: SHIPPED | OUTPATIENT
Start: 2023-10-05 | End: 2023-10-09

## 2023-10-05 RX ADMIN — IBUPROFEN 600 MG: 600 TABLET, FILM COATED ORAL at 04:24

## 2023-10-05 RX ADMIN — ACETAMINOPHEN 650 MG: 325 TABLET ORAL at 00:44

## 2023-10-05 RX ADMIN — DOCUSATE SODIUM 100 MG: 100 CAPSULE, LIQUID FILLED ORAL at 07:54

## 2023-10-05 RX ADMIN — ACETAMINOPHEN 650 MG: 325 TABLET ORAL at 06:17

## 2023-10-05 RX ADMIN — PRENATAL VITAMINS-IRON FUMARATE 27 MG IRON-FOLIC ACID 0.8 MG TABLET 1 TABLET: at 07:54

## 2023-10-05 RX ADMIN — IBUPROFEN 600 MG: 600 TABLET, FILM COATED ORAL at 09:56

## 2023-10-05 NOTE — DISCHARGE SUMMARY
Discharge Summary    Date of Admission: 10/1/2023  Date of Discharge:  10/5/2023      Patient: Elaina Pena      MR#:[id number]    Primary Surgeon/OB: Anita Flanagan MD    Discharge Surgeon/OB: Masha/Ramesh HEREDIA    Presenting Problem/History of Present Illness  Pregnancy [Z34.90]     Patient Active Problem List    Diagnosis     Delivery of pregnancy by  section [O82]     Maternal anemia in pregnancy, antepartum [O99.019]     Poor fetal growth affecting management of mother in third trimester [O36.5930]     History of abnormal cervical Pap smear [Z87.42]     Mild tetrahydrocannabinol (THC) abuse [F12.10]     Prenatal care, antepartum [Z34.90]          Discharge Diagnosis:  section at 39w2d    Procedures:  , Low Transverse     10/2/2023    7:29 PM            Discharge Date: 10/5/2023; Discharge Time: 10:18 EDT        Hospital Course  Patient is a 23 y.o. female  at 39w2d status post  section with uneventful postoperative recovery.  Patient was advanced to regular diet on postoperative day#1.  On discharge, ambulating, tolerating a regular diet without any difficulties and her incision is dry, clean and intact.     Infant:   male  fetus 3260 g (7 lb 3 oz)  with Apgar scores of 7 , 8  at five minutes.    Condition on Discharge:  Stable    Vital Signs  Temp:  [97.4 °F (36.3 °C)-98 °F (36.7 °C)] 97.4 °F (36.3 °C)  Heart Rate:  [72-94] 81  Resp:  [16] 16  BP: (125-134)/(73-82) 131/80    Lab Results   Component Value Date    WBC 11.51 (H) 10/04/2023    HGB 9.7 (L) 10/04/2023    HCT 30.2 (L) 10/04/2023    MCV 83.0 10/04/2023     (L) 10/04/2023       Discharge Disposition  Home or Self Care    Discharge Medications     Discharge Medications        New Medications        Instructions Start Date   ibuprofen 600 MG tablet  Commonly known as: ADVIL,MOTRIN   600 mg, Oral, Every 6 Hours      oxyCODONE 5 MG immediate release tablet  Commonly known as: ROXICODONE   5  mg, Oral, Every 4 Hours PRN             Continue These Medications        Instructions Start Date   IRON PO   Oral      prenatal (CLASSIC) vitamin  tablet  Generic drug: prenatal vitamin   Oral, Daily                   Follow-up Appointments  Future Appointments   Date Time Provider Department Center   11/16/2023  9:10 AM Anita Flanagan MD [facility]  [facility]     Additional Instructions for the Follow-ups that You Need to Schedule       Call MD With Problems / Concerns   As directed      Instructions: Discussed changes in postpartum mood-anxiety, depression, sadness to call office for evaluation as soon as symptoms arise even if before 6 week appointment. Monitor for excessive bleeding >1 pad/hr for several hours, dizziness, syncope, fever or changes in blood pressure.    Order Comments: Instructions: Discussed changes in postpartum mood-anxiety, depression, sadness to call office for evaluation as soon as symptoms arise even if before 6 week appointment. Monitor for excessive bleeding >1 pad/hr for several hours, dizziness, syncope, fever or changes in blood pressure.         Discharge Follow-up with Specified Provider: ; 2 Weeks   As directed      To:    Follow Up: 2 Weeks                YAN Sood  10/05/23  10:18 EDT  Csd

## 2023-10-09 ENCOUNTER — TELEPHONE (OUTPATIENT)
Dept: OBSTETRICS AND GYNECOLOGY | Facility: CLINIC | Age: 23
End: 2023-10-09
Payer: COMMERCIAL

## 2023-10-09 LAB — REF LAB TEST METHOD: NORMAL

## 2023-10-09 NOTE — TELEPHONE ENCOUNTER
Asked if our doc would want to do the circ on Elaina's baby or if they would prefer their rounding doc to do it. Stated would have to be today as baby is going home tomorrow

## 2023-10-23 ENCOUNTER — POSTPARTUM VISIT (OUTPATIENT)
Dept: OBSTETRICS AND GYNECOLOGY | Facility: CLINIC | Age: 23
End: 2023-10-23
Payer: COMMERCIAL

## 2023-10-23 VITALS
SYSTOLIC BLOOD PRESSURE: 124 MMHG | HEIGHT: 67 IN | BODY MASS INDEX: 27.03 KG/M2 | WEIGHT: 172.2 LBS | DIASTOLIC BLOOD PRESSURE: 82 MMHG

## 2023-10-23 DIAGNOSIS — O99.019 MATERNAL ANEMIA IN PREGNANCY, ANTEPARTUM: ICD-10-CM

## 2023-10-23 PROCEDURE — 0502F SUBSEQUENT PRENATAL CARE: CPT

## 2023-10-23 NOTE — PROGRESS NOTES
"      Chief Complaint   Patient presents with    Postpartum Care       Postpartum Visit         Elaina Pena is a 23 y.o.  who presents today for a 3 week(s) postpartum check.        , Low Transverse    Information for the patient's :  Jacques Devine [[id number]]   10/2/2023   male   Jacques Devine   3260 g (7 lb 3 oz)   Gestational Age: 39w2d    Baby Discharged with Mom  Delivering MD: Anita Flanagan MD.    Pregnancy complications: no known issues    Patient reports her incision is clean, dry, intact. Patient describes vaginal bleeding as moderate to light.  She is bottle feeding. Patient denies bowel or bladder issues.    She desires contraceptive methods: None for contraception.    Patient denies postpartum depression. Postpartum Depression Screening Questionnaire: 4, no treatment indicated.      The additional following portions of the patient's history were reviewed and updated as appropriate: allergies and current medications.      Review of Systems   All other systems reviewed and are negative.      I have reviewed and agree with the HPI, ROS, and historical information as entered above. Janie Quevedo, APRN      Objective   /82 (BP Location: Right arm, Patient Position: Sitting, Cuff Size: Adult)   Ht 170.2 cm (67.01\")   Wt 78.1 kg (172 lb 3.2 oz)   LMP 2022   Breastfeeding No   BMI 26.96 kg/m²     Physical Exam  Vitals and nursing note reviewed.   Constitutional:       Appearance: She is well-developed.   HENT:      Head: Normocephalic and atraumatic.   Pulmonary:      Effort: Pulmonary effort is normal.   Abdominal:      General: A surgical scar is present.      Palpations: Abdomen is soft. Abdomen is not rigid.          Comments: Clean, Dry, and Intact.  No erythema. Glue.   Musculoskeletal:      Cervical back: Normal range of motion.   Neurological:      Mental Status: She is alert and oriented to person, place, and time.   Psychiatric:         " Mood and Affect: Mood normal.         Behavior: Behavior normal.              Assessment and Plan    Problem List Items Addressed This Visit          Gravid and     Delivery of pregnancy by  section - Primary    Overview     10/2/0137-L-cfdempg for failure to progress.  39 weeks 2 days.  Baby boy named Stanley.            Hematology and Neoplasia    Maternal anemia in pregnancy, antepartum    Overview     11.2         Relevant Medications    Ferrous Sulfate (IRON PO)       S/p , 2 week(s) postpartum.  Doing well.    Continued pelvic rest with a return to driving and light physical activity.  Baby doing well.  Bottlefeeding going well.  No si/sx of postpartum depression  Contraception: contraceptive methods: Abstinence , will desire to start MARIELLA next visit.  Follow up in four weeks.    Janie Quevedo, APRN  10/23/2023

## 2023-11-16 ENCOUNTER — POSTPARTUM VISIT (OUTPATIENT)
Dept: OBSTETRICS AND GYNECOLOGY | Facility: CLINIC | Age: 23
End: 2023-11-16
Payer: COMMERCIAL

## 2023-11-16 VITALS
BODY MASS INDEX: 28.28 KG/M2 | WEIGHT: 180.2 LBS | SYSTOLIC BLOOD PRESSURE: 110 MMHG | DIASTOLIC BLOOD PRESSURE: 64 MMHG | HEIGHT: 67 IN

## 2023-11-16 DIAGNOSIS — Z30.011 ENCOUNTER FOR INITIAL PRESCRIPTION OF CONTRACEPTIVE PILLS: ICD-10-CM

## 2023-11-16 PROBLEM — Z34.90 PRENATAL CARE, ANTEPARTUM: Status: RESOLVED | Noted: 2023-02-27 | Resolved: 2023-11-16

## 2023-11-16 PROBLEM — F12.10 MILD TETRAHYDROCANNABINOL (THC) ABUSE: Status: RESOLVED | Noted: 2023-03-01 | Resolved: 2023-11-16

## 2023-11-16 PROBLEM — O99.019 MATERNAL ANEMIA IN PREGNANCY, ANTEPARTUM: Status: RESOLVED | Noted: 2023-07-27 | Resolved: 2023-11-16

## 2023-11-16 PROBLEM — O36.5930 POOR FETAL GROWTH AFFECTING MANAGEMENT OF MOTHER IN THIRD TRIMESTER: Status: RESOLVED | Noted: 2023-06-19 | Resolved: 2023-11-16

## 2023-11-16 RX ORDER — NORETHINDRONE ACETATE AND ETHINYL ESTRADIOL 1MG-20(21)
1 KIT ORAL DAILY
Qty: 28 TABLET | Refills: 12 | Status: SHIPPED | OUTPATIENT
Start: 2023-11-16 | End: 2024-11-15

## 2023-11-16 NOTE — PROGRESS NOTES
Chief Complaint   Patient presents with    Postpartum Care       Postpartum Visit         Elaina Pena is a 23 y.o.  who presents today for a 6 week(s) postpartum check.     C/S: failure to progress     , Low Transverse    Information for the patient's :  Jacques Devine [[id number]]   10/2/2023   male   Jacques Devine   3260 g (7 lb 3 oz)   Gestational Age: 39w2d        Baby Discharged: To NICU for 7 days for respiratory issues  Delivering Physician: Anita Flanagan MD    At the time of delivery were you diagnosed with any of the following: None. The incision is healing well. Patient describes vaginal bleeding as light.  Patient is bottle feeding.  She desires contraceptive methods: OCP (estrogen/progesterone) for contraception.  Patient denies bowel or bladder issues.      Patient denies postpartum depression. Postpartum Depression Screening Questionnaire: 0, no treatment indicated.      Last Pap : 2023. Results: ASCUS. HPV: negative.   Last Completed Pap Smear            PAP SMEAR (Every 3 Years) Next due on 2023  LIQUID-BASED PAP SMEAR, [facility] ([facility])                      The additional following portions of the patient's history were reviewed and updated as appropriate: allergies, current medications, past family history, past medical history, past social history, past surgical history, and problem list.    Review of Systems   Constitutional: Negative.    HENT: Negative.     Eyes: Negative.    Respiratory: Negative.     Cardiovascular: Negative.    Gastrointestinal: Negative.    Endocrine: Negative.    Genitourinary: Negative.    Musculoskeletal: Negative.    Skin:  Positive for wound.   Allergic/Immunologic: Negative.    Neurological: Negative.    Hematological: Negative.    Psychiatric/Behavioral: Negative.       All other systems reviewed and are negative.     I have reviewed and agree with the HPI, ROS, and historical  "information as entered above. Anita Flanagan MD      /64   Ht 170.2 cm (67\")   Wt 81.7 kg (180 lb 3.2 oz)   LMP 2022   Breastfeeding No   BMI 28.22 kg/m²     Physical Exam  Vitals and nursing note reviewed. Exam conducted with a chaperone present.   Constitutional:       Appearance: She is well-developed.   HENT:      Head: Normocephalic and atraumatic.   Pulmonary:      Effort: Pulmonary effort is normal.   Abdominal:      General: A surgical scar is present.      Palpations: Abdomen is soft. Abdomen is not rigid.      Comments: Clean, Dry, and Intact.  No erythema.    Genitourinary:     Vagina: No lesions or prolapsed vaginal walls.      Cervix: No lesion or erythema.      Uterus: Enlarged. Not tender and no uterine prolapse.       Adnexa:         Right: No mass, tenderness or fullness.          Left: No mass, tenderness or fullness.     Musculoskeletal:      Cervical back: Normal range of motion.   Neurological:      Mental Status: She is alert and oriented to person, place, and time.   Psychiatric:         Mood and Affect: Mood normal.         Behavior: Behavior normal.             Assessment and Plan    Problem List Items Addressed This Visit          Gravid and     Delivery of pregnancy by  section - Primary    Overview     10/2/4156-H-wznzvam for failure to progress.  39 weeks 2 days.  Baby boy named Stanley.          Other Visit Diagnoses       Encounter for initial prescription of contraceptive pills        Relevant Medications    norethindrone-ethinyl estradiol FE (Junel FE 1/20) 1-20 MG-MCG per tablet            S/p , 6 week(s) postpartum.  Doing well.    Return to normal physical activity.  No pelvic restrictions.   Baby doing well.  Bottlefeeding going well.  No si/sx of postpartum depression  Contraception: contraceptive methods: OCP (estrogen/progesterone)   please provide note okay to go back to work on 2023  Return in about 4 months (around " 3/16/2024) for Annual physical.     Anita Flanagan MD  11/16/2023

## 2024-02-14 ENCOUNTER — HOSPITAL ENCOUNTER (EMERGENCY)
Age: 24
Discharge: HOME | End: 2024-02-14
Payer: COMMERCIAL

## 2024-02-14 VITALS
DIASTOLIC BLOOD PRESSURE: 86 MMHG | SYSTOLIC BLOOD PRESSURE: 136 MMHG | TEMPERATURE: 98.3 F | RESPIRATION RATE: 19 BRPM | OXYGEN SATURATION: 99 % | HEART RATE: 76 BPM

## 2024-02-14 VITALS
DIASTOLIC BLOOD PRESSURE: 86 MMHG | HEART RATE: 76 BPM | TEMPERATURE: 98.3 F | RESPIRATION RATE: 19 BRPM | SYSTOLIC BLOOD PRESSURE: 136 MMHG | OXYGEN SATURATION: 99 %

## 2024-02-14 VITALS — BODY MASS INDEX: 28.5 KG/M2

## 2024-02-14 DIAGNOSIS — R11.0: ICD-10-CM

## 2024-02-14 DIAGNOSIS — R07.0: ICD-10-CM

## 2024-02-14 DIAGNOSIS — J01.90: ICD-10-CM

## 2024-02-14 DIAGNOSIS — R05.9: ICD-10-CM

## 2024-02-14 DIAGNOSIS — R51.9: ICD-10-CM

## 2024-02-14 DIAGNOSIS — U07.1: Primary | ICD-10-CM

## 2024-02-14 PROCEDURE — 99212 OFFICE O/P EST SF 10 MIN: CPT

## 2024-02-14 PROCEDURE — 87804 INFLUENZA ASSAY W/OPTIC: CPT

## 2024-02-14 PROCEDURE — 99214 OFFICE O/P EST MOD 30 MIN: CPT

## 2024-02-14 PROCEDURE — G0463 HOSPITAL OUTPT CLINIC VISIT: HCPCS

## 2024-02-14 PROCEDURE — 87880 STREP A ASSAY W/OPTIC: CPT

## 2024-02-14 PROCEDURE — 87636 SARSCOV2 & INF A&B AMP PRB: CPT

## 2024-02-14 NOTE — EXP.UTC
Discharge Plan
Disposition
Patient Disposition: Home, Self-Care

Condition: Good

Prescriptions
Prescriptions:
New
  azithromycin [Zithromax] 250 mg tablet 
   250 mg PO UD DOSE PK Qty: 6 0RF
   Rx Instructions:
   Take two (2) tablets today, then one (1) tablet days #2 thru #5
  methylprednisolone 4 mg Tablets,Dose Pack 
   4 mg PO AS DIRECTED 6 Days Qty: 21 0RF
   Rx Instructions:
   Take 1 pack as directed for 6 days
  brompheniramine-pseudoeph-DM [Bromfed DM] 2-30-10 mg/5 mL Syrup 
   5 ml PO Q6H PRN (Reason: Cough) Qty: 240 0RF
  ondansetron 4 mg Tablet,Disintegrating 
   4 mg PO Q8H PRN (Reason: Nausea) Qty: 9 0RF

Referrals
Follow up/Referrals:
Provider,Referral, MD [Primary Care Provider] - See instructions

Activity Restrictions/Add. Instructions
Additional Instructions/Restrictions:
Drink plenty of fluids.

Take tylenol or ibuprofen for pain or fever.

Take the medications as directed.

Follow up with your regular doctor.

***GO TO THE ER FOR ANY WORSENING SYMPTOMS***

Clinical Impressions
Clinical Impression:
 Viral syndrome, Sinusitis


Stand Alone Forms
Stand Alone Forms:  Work/School Release

Instructions
Patient Instructions:  DI for Sinusitis, DI for Viral Syndrome

Discharge
ED Provider: Waldo Dillon


[facility]
General
Stated complaint: headache, sore throat
Time Seen by Provider: 02/14/24 11:59
History of Present Illness
Provider Complaint: She states that for the past 2 days she has had headache, sore throat, malaise, body aches, and chills.
Related Data
Previous Rx's

 Medication  Instructions  Recorded
azithromycin 250 mg tablet 250 mg PO UD DOSE PK #6 tabs 02/14/24
(Zithromax)  
brompheniramine-pseudoephedrine-DM 5 ml PO Q6H PRN Cough #240 mL 02/14/24
2 mg-30 mg-10 mg/5 mL oral syrup  
(Bromfed DM)  
methylprednisolone 4 mg tablets in 4 mg PO AS DIRECTED 6 days #21 tabs 02/14/24
a dose pack  
ondansetron 4 mg disintegrating 4 mg PO Q8H PRN Nausea #9 tabs 02/14/24
tablet  


Allergies

Allergy/AdvReac Type Severity Reaction Status Date / Time
No Known Allergies Allergy   Verified 02/14/24 12:14



PFSH
PFSH
Disclaimer: 
The information contained in this section may have been updated after the patient was seen, as this information can be updated by other users.

Social History (Updated 10/04/22 @ 22:00 by JEREMY Martin)
Smoking Status:  Never smoker 
alcohol intake:  never 
current occupational status:  employed 
Travel in the last 8 weeks:  None 



ROS Obtained: Yes All systems reviewed & no additional complaints except as documented
Constitutional
Constitutional: Reports chills and Reports fever(s)
Eyes
Eyes: Denies eye discharge
ENT
Ears, Nose, Mouth, and Throat: Reports as per HPI
Cardiovascular
Cardiovascular: Denies chest pain
Respiratory
Respiratory: Denies chest congestion and Reports cough
Gastrointestinal
Gastrointestingal: Reports nausea; Denies abdominal pain, constipation, cramping, diarrhea or vomiting
Musculoskeletal
Musculoskeletal: Denies arthralgias
Integumentary/Breasts
Skin/Breast: Denies rash
Neurologic
Neurologic: Denies paresthesias

Physical Exam
General
General appearance: alert and in no apparent distress
Eye
Eye exam: Present normal appearance, PERRL and EOMI
ENT
ENT exam: Present mucous membranes moist and normal external ear exam
Expanded ENT Exam
External ear exam: Present normal external inspection
TM/Canal exam: Bilateral TM: erythema and bulging
Nose exam: Absent sinus tenderness
Nasal speculum exam: Bilateral: normal
Mouth exam: Present normal external inspection; Absent drooling
Teeth exam: Present normal inspection
Throat exam: Present tonsillar erythema and tonsillomegaly
Neck
Neck exam: Present normal inspection, full ROM and trachea midline; Absent tenderness, lymphadenopathy or thyromegaly
Chest
Chest inspection: Present normal inspection and symmetric chest wall rise; Absent tenderness or rash
Respiratory
Respiratory exam: Present normal lung sounds bilaterally; Absent respiratory distress, wheezes, stridor or accessory muscle use
Cardiovascular
Cardiovascular exam: Present regular rate, normal rhythm and normal heart sounds
Abdominal Exam
Abdominal exam: Present soft; Absent distention, tenderness, guarding, rebound or rigidity
Extremities Exam
Extremities exam: Present normal inspection, full ROM and normal capillary refill; Absent tenderness or calf tenderness
Back Exam
Back exam: Present normal inspection and full ROM; Absent tenderness
Neurological Exam
Neurological exam: Present alert and oriented X3
Psychiatric
Psychiatric exam: Present normal affect and normal mood
Skin
Skin exam: Present warm, dry, intact and normal color
Lymphatic
Lymphatic Findings: no adenopathy

Medical Decision Making
Medical Records
Medical records reviewed: No I reviewed the patient's medical records.
Simone Inquiry
Pt receiving controlled substance: No
Lab Data
Lab results reviewed: Yes I reviewed the patient's lab results.

## 2024-02-14 NOTE — ED_ITS
Discharge Plan    
Disposition    
Patient Disposition: Home, Self-Care    
    
Condition: Good    
    
Prescriptions    
Prescriptions:    
New    
  azithromycin [Zithromax] 250 mg tablet     
   250 mg PO UD DOSE PK Qty: 6 0RF    
   Rx Instructions:    
   Take two (2) tablets today, then one (1) tablet days #2 thru #5    
  methylprednisolone 4 mg Tablets,Dose Pack     
   4 mg PO AS DIRECTED 6 Days Qty: 21 0RF    
   Rx Instructions:    
   Take 1 pack as directed for 6 days    
  brompheniramine-pseudoeph-DM [Bromfed DM] 2-30-10 mg/5 mL Syrup     
   5 ml PO Q6H PRN (Reason: Cough) Qty: 240 0RF    
  ondansetron 4 mg Tablet,Disintegrating     
   4 mg PO Q8H PRN (Reason: Nausea) Qty: 9 0RF    
    
Referrals    
Follow up/Referrals:    
Provider,Referral, MD [Primary Care Provider] - See instructions    
    
Activity Restrictions/Add. Instructions    
Additional Instructions/Restrictions:    
Drink plenty of fluids.    
    
Take tylenol or ibuprofen for pain or fever.    
    
Take the medications as directed.    
    
Follow up with your regular doctor.    
    
***GO TO THE ER FOR ANY WORSENING SYMPTOMS***    
    
Clinical Impressions    
Clinical Impression:    
 Viral syndrome, Sinusitis    
    
    
Stand Alone Forms    
Stand Alone Forms:  Work/School Release    
    
Instructions    
Patient Instructions:  DI for Sinusitis, DI for Viral Syndrome    
    
Discharge    
ED Provider: Waldo Dillon    
    
    
[facility]    
General    
Stated complaint: headache, sore throat    
Time Seen by Provider: 02/14/24 11:59    
History of Present Illness    
Provider Complaint: She states that for the past 2 days she has had headache,   
sore throat, malaise, body aches, and chills.    
Related Data    
                                  Previous Rx's    
    
    
    
 Medication  Instructions  Recorded    
     
azithromycin 250 mg tablet 250 mg PO UD DOSE PK #6 tabs 02/14/24    
    
(Zithromax)      
     
brompheniramine-pseudoephedrine-DM 5 ml PO Q6H PRN Cough #240 mL 02/14/24    
    
2 mg-30 mg-10 mg/5 mL oral syrup      
    
(Bromfed DM)      
     
methylprednisolone 4 mg tablets in 4 mg PO AS DIRECTED 6 days #21 tabs 02/14/24    
    
a dose pack      
     
ondansetron 4 mg disintegrating 4 mg PO Q8H PRN Nausea #9 tabs 02/14/24    
    
tablet      
    
    
    
                                    Allergies    
    
    
    
Allergy/AdvReac Type Severity Reaction Status Date / Time    
     
No Known Allergies Allergy   Verified 02/14/24 12:14    
    
    
    
    
PFSH    
PFSH    
Disclaimer:     
The information contained in this section may have been updated after the   
patient was seen, as this information can be updated by other users.    
    
Social History (Updated 10/04/22 @ 22:00 by JEREMY Martin)    
Smoking Status:  Never smoker     
alcohol intake:  never     
current occupational status:  employed     
Travel in the last 8 weeks:  None     
    
    
    
ROS Obtained: Yes All systems reviewed & no additional complaints except as   
documented    
Constitutional    
Constitutional: Reports chills and Reports fever(s)    
Eyes    
Eyes: Denies eye discharge    
ENT    
Ears, Nose, Mouth, and Throat: Reports as per HPI    
Cardiovascular    
Cardiovascular: Denies chest pain    
Respiratory    
Respiratory: Denies chest congestion and Reports cough    
Gastrointestinal    
Gastrointestingal: Reports nausea; Denies abdominal pain, constipation,   
cramping, diarrhea or vomiting    
Musculoskeletal    
Musculoskeletal: Denies arthralgias    
Integumentary/Breasts    
Skin/Breast: Denies rash    
Neurologic    
Neurologic: Denies paresthesias    
    
Physical Exam    
General    
General appearance: alert and in no apparent distress    
Eye    
Eye exam: Present normal appearance, PERRL and EOMI    
ENT    
ENT exam: Present mucous membranes moist and normal external ear exam    
Expanded ENT Exam    
External ear exam: Present normal external inspection    
TM/Canal exam: Bilateral TM: erythema and bulging    
Nose exam: Absent sinus tenderness    
Nasal speculum exam: Bilateral: normal    
Mouth exam: Present normal external inspection; Absent drooling    
Teeth exam: Present normal inspection    
Throat exam: Present tonsillar erythema and tonsillomegaly    
Neck    
Neck exam: Present normal inspection, full ROM and trachea midline; Absent   
tenderness, lymphadenopathy or thyromegaly    
Chest    
Chest inspection: Present normal inspection and symmetric chest wall rise;   
Absent tenderness or rash    
Respiratory    
Respiratory exam: Present normal lung sounds bilaterally; Absent respiratory   
distress, wheezes, stridor or accessory muscle use    
Cardiovascular    
Cardiovascular exam: Present regular rate, normal rhythm and normal heart sounds    
Abdominal Exam    
Abdominal exam: Present soft; Absent distention, tenderness, guarding, rebound   
or rigidity    
Extremities Exam    
Extremities exam: Present normal inspection, full ROM and normal capillary   
refill; Absent tenderness or calf tenderness    
Back Exam    
Back exam: Present normal inspection and full ROM; Absent tenderness    
Neurological Exam    
Neurological exam: Present alert and oriented X3    
Psychiatric    
Psychiatric exam: Present normal affect and normal mood    
Skin    
Skin exam: Present warm, dry, intact and normal color    
Lymphatic    
Lymphatic Findings: no adenopathy    
    
Medical Decision Making    
Medical Records    
Medical records reviewed: No I reviewed the patient's medical records.    
Simone Inquiry    
Pt receiving controlled substance: No    
Lab Data    
Lab results reviewed: Yes I reviewed the patient's lab results.

## 2025-02-05 ENCOUNTER — INITIAL PRENATAL (OUTPATIENT)
Dept: OBSTETRICS AND GYNECOLOGY | Facility: CLINIC | Age: 25
End: 2025-02-05
Payer: COMMERCIAL

## 2025-02-05 VITALS — DIASTOLIC BLOOD PRESSURE: 66 MMHG | SYSTOLIC BLOOD PRESSURE: 112 MMHG | BODY MASS INDEX: 25.61 KG/M2 | WEIGHT: 163.5 LBS

## 2025-02-05 DIAGNOSIS — Z11.8 SCREENING FOR CHLAMYDIAL DISEASE: ICD-10-CM

## 2025-02-05 DIAGNOSIS — Z87.42 HISTORY OF ABNORMAL CERVICAL PAP SMEAR: ICD-10-CM

## 2025-02-05 DIAGNOSIS — F41.9 ANXIETY AND DEPRESSION: ICD-10-CM

## 2025-02-05 DIAGNOSIS — Z34.90 PRENATAL CARE, ANTEPARTUM: ICD-10-CM

## 2025-02-05 DIAGNOSIS — F32.A ANXIETY AND DEPRESSION: ICD-10-CM

## 2025-02-05 RX ORDER — ESCITALOPRAM OXALATE 10 MG/1
TABLET ORAL
COMMUNITY
Start: 2025-01-30

## 2025-02-05 NOTE — PROGRESS NOTES
Initial ob visit     CC- Here for care of pregnancy        Elaina Pena is a 24 y.o. female, , who presents for her first obstetrical visit.  Patient's last menstrual period was 2024.. Her HORACIO is 2025, by Last Menstrual Period. Current GA is 8w4d.     Initial positive test date : 2025, UPT        Her periods are every 28 days.  Prior obstetric issues: none  Patient's past medical history is significant for:  none .  Family history of genetic issues (includes FOB): none  Prior infections concerning in pregnancy (Rash, fever in last 2 weeks): No  Varicella Hx - vaccinated  Prior testing for Cystic Fibrosis Carrier or Sickle Cell Trait- none  Prepregnancy BMI - Body mass index is 25.61 kg/m².  History of STD: no  Hx of HSV for patient or partner: no  US done today: Yes.  Findings showed  single intrauterine pregnancy measuring 8 weeks 1 day today with fetal heart rate of 163..  I have personally evaluated the U/S and agree with the findings. Anita Flanagan MD      OB History    Para Term  AB Living   2 1 1 0 0 1   SAB IAB Ectopic Molar Multiple Live Births   0 0 0 0 0 1      # Outcome Date GA Lbr Arvind/2nd Weight Sex Type Anes PTL Lv   2 Current            1 Term 10/02/23 39w2d  3260 g (7 lb 3 oz) M CS-LTranv EPI, Spinal N JUANITA       Additional Pertinent History   Last Pap : 2023 Result: ASCUS HPV: negative     Last Completed Pap Smear            Ordered - PAP SMEAR (Every 3 Years) Ordered on 2023  LIQUID-BASED PAP SMEAR, [facility] ([facility])                  History of abnormal Pap smear: yes -   Family history of uterine, colon, breast, or ovarian cancer: no  Feelings of Anxiety or Depression: yes - controlled on medicaiton  Tobacco Usage?: No   Alcohol/Drug Use?: NO  Over the age of 35 at delivery: no  Genetic Screening: desires no genetic testing  Flu Status: Declines    PMH    Current Outpatient Medications:     escitalopram  (LEXAPRO) 10 MG tablet, , Disp: , Rfl:     prenatal vitamin (prenatal, CLASSIC, vitamin) tablet, Take  by mouth Daily., Disp: , Rfl:      History reviewed. No pertinent past medical history.     Past Surgical History:   Procedure Laterality Date     SECTION N/A 10/2/2023    Procedure:  SECTION PRIMARY;  Surgeon: Anita Flanagan MD;  Location: [facility] LABOR DELIVERY;  Service: Obstetrics/Gynecology;  Laterality: N/A;    WISDOM TOOTH EXTRACTION         Review of Systems   Review of Systems    Patient Reports:  nausea and vomiting  Patient Denies:excessive nausea , excessive vomiting, and vaginal bleeding  All systems reviewed and otherwise normal.    I have reviewed and agree with the HPI, ROS, and historical information as entered above. Anita Flanagan MD      /66   Wt 74.2 kg (163 lb 8 oz)   LMP 2024   BMI 25.61 kg/m²     The additional following portions of the patient's history were reviewed and updated as appropriate: allergies, current medications, past family history, past medical history, past social history, past surgical history, and problem list.    Physical Exam  General:  well developed; well nourished  no acute distress  mentation appropriate   Chest/Respiratory: No labored breathing, normal respiratory effort, normal appearance, no respiratory noises noted   Heart:  normal rate, regular rhythm,  no murmurs, rubs, or gallops   Thyroid: normal to inspection and palpation   Breasts:  Not performed.   Abdomen: soft, non-tender; no masses  no umbilical or inguinal hernias are present  no hepato-splenomegaly   Pelvis: Clinical staff was present for exam  External genitalia:  normal appearance of the external genitalia including Bartholin's and [place]'s glands.  :  urethral meatus normal; urethra normal:  Vaginal:  normal pink mucosa without prolapse or lesions.  Cervix:  normal appearance.  Uterus:  normal size, shape and consistency.  Adnexa:  normal bimanual exam  of the adnexa.        Assessment and Plan    Problem List Items Addressed This Visit          Genitourinary and Reproductive     History of abnormal cervical Pap smear    Overview     2023-ASCUS, HPV negative.  Repeat 1 year            Gravid and     Delivery of pregnancy by  section - Primary    Overview     10/2/1697-[id number] for failure to progress.  39 weeks 2 days.  Baby boy named Stanley.         Prenatal care, antepartum    Relevant Orders    LIQUID-BASED PAP SMEAR WITH HPV GENOTYPING IF ASCUS ([facility])    Obstetric Panel    HIV-1 / O / 2 Ag / Antibody    Urine Culture - Urine, Urine, Clean Catch    Urinalysis With Microscopic - Urine, Clean Catch    Urine Drug Screen - Urine, Clean Catch       Mental Health    Anxiety and depression    Overview     Pt taking lexapro prescribed by PCP since around Aug 2024. PT states she feels good on it and is currently doing okay with 5mg.          Relevant Medications    escitalopram (LEXAPRO) 10 MG tablet     Other Visit Diagnoses       Screening for chlamydial disease        Relevant Orders    LIQUID-BASED PAP SMEAR WITH HPV GENOTYPING IF ASCUS ([facility])            Pregnancy at 8w4d  Reviewed routine prenatal care with the office and educational materials given  Lab(s) Ordered  Discussed options for genetic testing including first trimester nuchal translucency screen, genetic disease carrier testing, quadruple screen, and NIPT  Patient is on Prenatal vitamins  Activity recommendation : 150 minutes/week of moderate intensity aerobic activity unless we limit for bleeding, hypertension or other pregnancy complication   Return in about 4 weeks (around 3/5/2025).      Anita Flanagan MD  2025

## 2025-02-06 LAB
ABO GROUP BLD: NORMAL
APPEARANCE UR: CLEAR
BACTERIA #/AREA URNS HPF: NORMAL /[HPF]
BASOPHILS # BLD AUTO: 0 X10E3/UL (ref 0–0.2)
BASOPHILS NFR BLD AUTO: 0 %
BILIRUB UR QL STRIP: NEGATIVE
BLD GP AB SCN SERPL QL: NEGATIVE
CASTS URNS QL MICRO: NORMAL /LPF
COLOR UR: YELLOW
EOSINOPHIL # BLD AUTO: 0.1 X10E3/UL (ref 0–0.4)
EOSINOPHIL NFR BLD AUTO: 1 %
EPI CELLS #/AREA URNS HPF: NORMAL /HPF (ref 0–10)
ERYTHROCYTE [DISTWIDTH] IN BLOOD BY AUTOMATED COUNT: 12.7 % (ref 11.7–15.4)
GLUCOSE UR QL STRIP: NEGATIVE
HBV SURFACE AG SERPL QL IA: NEGATIVE
HCT VFR BLD AUTO: 41.8 % (ref 34–46.6)
HCV IGG SERPL QL IA: NON REACTIVE
HGB BLD-MCNC: 13.5 G/DL (ref 11.1–15.9)
HGB UR QL STRIP: NEGATIVE
HIV 1+2 AB+HIV1 P24 AG SERPL QL IA: NON REACTIVE
IMM GRANULOCYTES # BLD AUTO: 0 X10E3/UL (ref 0–0.1)
IMM GRANULOCYTES NFR BLD AUTO: 0 %
KETONES UR QL STRIP: NEGATIVE
LEUKOCYTE ESTERASE UR QL STRIP: ABNORMAL
LYMPHOCYTES # BLD AUTO: 2.2 X10E3/UL (ref 0.7–3.1)
LYMPHOCYTES NFR BLD AUTO: 24 %
MCH RBC QN AUTO: 26.8 PG (ref 26.6–33)
MCHC RBC AUTO-ENTMCNC: 32.3 G/DL (ref 31.5–35.7)
MCV RBC AUTO: 83 FL (ref 79–97)
MICRO URNS: ABNORMAL
MONOCYTES # BLD AUTO: 0.8 X10E3/UL (ref 0.1–0.9)
MONOCYTES NFR BLD AUTO: 9 %
NEUTROPHILS # BLD AUTO: 5.9 X10E3/UL (ref 1.4–7)
NEUTROPHILS NFR BLD AUTO: 66 %
NITRITE UR QL STRIP: NEGATIVE
PH UR STRIP: 7 [PH] (ref 5–7.5)
PLATELET # BLD AUTO: 250 X10E3/UL (ref 150–450)
PROT UR QL STRIP: NEGATIVE
RBC # BLD AUTO: 5.04 X10E6/UL (ref 3.77–5.28)
RBC #/AREA URNS HPF: NORMAL /HPF (ref 0–2)
REF LAB TEST METHOD: NORMAL
RH BLD: POSITIVE
RPR SER QL: NON REACTIVE
RUBV IGG SERPL IA-ACNC: 2.83 INDEX
SP GR UR STRIP: 1.02 (ref 1–1.03)
UROBILINOGEN UR STRIP-MCNC: 1 MG/DL (ref 0.2–1)
WBC # BLD AUTO: 9.1 X10E3/UL (ref 3.4–10.8)
WBC #/AREA URNS HPF: NORMAL /HPF (ref 0–5)

## 2025-02-07 LAB
AMPHETAMINES UR QL SCN: NEGATIVE NG/ML
BACTERIA UR CULT: NORMAL
BACTERIA UR CULT: NORMAL
BARBITURATES UR QL SCN: NEGATIVE NG/ML
BENZODIAZ UR QL SCN: NEGATIVE NG/ML
BZE UR QL SCN: NEGATIVE NG/ML
CANNABINOIDS UR QL SCN: POSITIVE NG/ML
CREAT UR-MCNC: 118.8 MG/DL (ref 20–300)
LABORATORY COMMENT REPORT: ABNORMAL
METHADONE UR QL SCN: NEGATIVE NG/ML
OPIATES UR QL SCN: NEGATIVE NG/ML
OXYCODONE+OXYMORPHONE UR QL SCN: NEGATIVE NG/ML
PCP UR QL: NEGATIVE NG/ML
PH UR: 6.9 [PH] (ref 4.5–8.9)
PROPOXYPH UR QL SCN: NEGATIVE NG/ML

## 2025-02-26 ENCOUNTER — TELEPHONE (OUTPATIENT)
Dept: OBSTETRICS AND GYNECOLOGY | Facility: CLINIC | Age: 25
End: 2025-02-26
Payer: COMMERCIAL

## 2025-02-26 DIAGNOSIS — O21.9 NAUSEA AND VOMITING IN PREGNANCY: Primary | ICD-10-CM

## 2025-02-26 RX ORDER — PROMETHAZINE HYDROCHLORIDE 25 MG/1
25 SUPPOSITORY RECTAL EVERY 6 HOURS PRN
Qty: 3 SUPPOSITORY | Refills: 0 | Status: SHIPPED | OUTPATIENT
Start: 2025-02-26 | End: 2025-03-03

## 2025-02-26 NOTE — TELEPHONE ENCOUNTER
Pt 's called in, pt has been in ED  2/24/25 on Monday  in [facility],,, medication was given medication its not working. Pt is vomiting, nausea, not able to keep anything down, no appetite. No bleeding, no clots, no fluid leaking...

## 2025-02-26 NOTE — TELEPHONE ENCOUNTER
Patient returned my call.  Reports N&V since 02/24/25. States she has not been able to retain any oral intake since then.   She was seen in the ER 02/24/25 in [place]. States they did not give her any IV fluids but did give her a RX for Reglan. She has been unable to retain Reglan.  Reports emesis 6-7 times today. Denies any diarrhea, cramping, or other symptoms.   Reports she has voided twice today; normal amount and color.  Discussed with YAN Crane. She agreed to send RX for Phenergan suppositories; patient will need to go to the ER if still unable to retain oral intake with Phenergan.  Informed patient. She v/u and agreed.

## 2025-02-26 NOTE — TELEPHONE ENCOUNTER
Patient of Dr. Flanagan;  @ 11w 4d. LOV 25; NOV 25.  Attempted to return call to patient; left voice message to call back.

## 2025-03-03 ENCOUNTER — ROUTINE PRENATAL (OUTPATIENT)
Dept: OBSTETRICS AND GYNECOLOGY | Facility: CLINIC | Age: 25
End: 2025-03-03
Payer: COMMERCIAL

## 2025-03-03 VITALS — SYSTOLIC BLOOD PRESSURE: 118 MMHG | DIASTOLIC BLOOD PRESSURE: 62 MMHG | WEIGHT: 158 LBS | BODY MASS INDEX: 24.75 KG/M2

## 2025-03-03 DIAGNOSIS — F32.A ANXIETY AND DEPRESSION: ICD-10-CM

## 2025-03-03 DIAGNOSIS — F41.9 ANXIETY AND DEPRESSION: ICD-10-CM

## 2025-03-03 DIAGNOSIS — Z34.90 PRENATAL CARE, ANTEPARTUM: ICD-10-CM

## 2025-03-03 DIAGNOSIS — Z34.91 PRENATAL CARE IN FIRST TRIMESTER: Primary | ICD-10-CM

## 2025-03-03 LAB
GLUCOSE UR STRIP-MCNC: NEGATIVE MG/DL
PROT UR STRIP-MCNC: NEGATIVE MG/DL

## 2025-03-03 RX ORDER — METOCLOPRAMIDE 10 MG/1
10 TABLET ORAL
COMMUNITY
Start: 2025-02-24

## 2025-03-03 NOTE — PROGRESS NOTES
OB FOLLOW UP  CC- Here for care of pregnancy        Elaina Pena is a 24 y.o.  12w2d patient being seen today for her obstetrical follow up visit. Patient reports improving nausea and vomiting. Patient reports vomiting 1-2 times daily. Patient completed Phenergan. Patient is taking Reglan that she was prescribed from the ER in [place], [place] on 2025. Patient states that she went to the ER because she was unable to keep fluids down.     Her prenatal care is complicated by (and status) :   Patient Active Problem List   Diagnosis    History of abnormal cervical Pap smear    Delivery of pregnancy by  section    Prenatal care, antepartum    Anxiety and depression       Genetic testing?: declines.  NOB labs reviewed  Flu Status: Declines  Ultrasound Today: No    ROS -   Patient Denies: leaking of fluid, vaginal bleeding, dysuria, excessive vomiting, and more than 6 contractions per hour  Other than what is documented in the HPI, all other systems reviewed and are negative.     The additional following portions of the patient's history were reviewed and updated as appropriate: allergies and current medications.    I have reviewed and agree with the HPI, ROS, and historical information as entered above. Anita Flanagan MD          /62   Wt 71.7 kg (158 lb)   LMP 2024   BMI 24.75 kg/m²         EXAM:     Prenatal Vitals  BP: 118/62  Weight: 71.7 kg (158 lb)   Fetal Heart Rate: 165          Urine Glucose Read-only: Negative  Urine Protein Read-only: Negative       Assessment and Plan    Problem List Items Addressed This Visit          Gravid and     Delivery of pregnancy by  section    Overview     10/2/1560-[id number] for failure to progress.  39 weeks 2 days.  Baby boy named Stanley.  Repeat 2025 at 7:30 AM         Prenatal care, antepartum    Overview     Declines genetic testing            Mental Health    Anxiety and depression    Overview      Pt taking lexapro prescribed by PCP since around Aug 2024. PT states she feels good on it and is currently doing okay with 5mg.          Relevant Medications    escitalopram (LEXAPRO) 10 MG tablet     Other Visit Diagnoses       Prenatal care in first trimester    -  Primary    Relevant Orders    POC Urinalysis Dipstick (Completed)            Pregnancy at 12w2d  Labs reviewed from New OB Visit.  Counseled on genetic testing, carrier status and option for NT screen  Activity and Exercise discussed.  Patient is on Prenatal vitamins  Return in about 4 weeks (around 3/31/2025).    Anita Flanagan MD  03/03/2025

## 2025-03-31 ENCOUNTER — ROUTINE PRENATAL (OUTPATIENT)
Dept: OBSTETRICS AND GYNECOLOGY | Facility: CLINIC | Age: 25
End: 2025-03-31
Payer: COMMERCIAL

## 2025-03-31 VITALS — DIASTOLIC BLOOD PRESSURE: 76 MMHG | WEIGHT: 160 LBS | BODY MASS INDEX: 25.06 KG/M2 | SYSTOLIC BLOOD PRESSURE: 108 MMHG

## 2025-03-31 DIAGNOSIS — F32.A ANXIETY AND DEPRESSION: ICD-10-CM

## 2025-03-31 DIAGNOSIS — Z36.89 ENCOUNTER FOR FETAL ANATOMIC SURVEY: ICD-10-CM

## 2025-03-31 DIAGNOSIS — Z87.42 HISTORY OF ABNORMAL CERVICAL PAP SMEAR: ICD-10-CM

## 2025-03-31 DIAGNOSIS — Z34.92 PRENATAL CARE IN SECOND TRIMESTER: Primary | ICD-10-CM

## 2025-03-31 DIAGNOSIS — Z34.90 PRENATAL CARE, ANTEPARTUM: ICD-10-CM

## 2025-03-31 DIAGNOSIS — F41.9 ANXIETY AND DEPRESSION: ICD-10-CM

## 2025-03-31 LAB
GLUCOSE UR STRIP-MCNC: NEGATIVE MG/DL
PROT UR STRIP-MCNC: NEGATIVE MG/DL

## 2025-03-31 PROCEDURE — 0502F SUBSEQUENT PRENATAL CARE: CPT

## 2025-03-31 NOTE — PROGRESS NOTES
OB FOLLOW UP  CC- Here for care of pregnancy        Elaina Pena is a 24 y.o.  16w2d patient being seen today for her obstetrical follow up visit. Patient reports no complaints.    Her prenatal care is complicated by (and status) :   Patient Active Problem List   Diagnosis    History of abnormal cervical Pap smear    Delivery of pregnancy by  section    Prenatal care, antepartum    Anxiety and depression       Flu Status: Declines  Ultrasound Today: No    AFP: declines    ROS -   Patient Denies: leaking of fluid, vaginal bleeding, dysuria, excessive vomiting, and more than 6 contractions per hour  Fetal Movement: present  Other than what is documented in the HPI, all other systems reviewed and are negative.       The additional following portions of the patient's history were reviewed and updated as appropriate: allergies and current medications.      I have reviewed and agree with the HPI, ROS, and historical information as entered above. Kamilla Jay, APRN          EXAM:     Prenatal Vitals  BP: 108/76  Weight: 72.6 kg (160 lb)   Fetal Heart Rate: 160         Urine Glucose Read-only: Negative  Urine Protein Read-only: Negative           Assessment and Plan    Problem List Items Addressed This Visit       Anxiety and depression    Overview   Pt taking lexapro prescribed by PCP since around Aug 2024. PT states she feels good on it and is currently doing okay with 5mg.          Relevant Medications    escitalopram (LEXAPRO) 10 MG tablet    doxylamine (UNISOM) 25 MG tablet    Delivery of pregnancy by  section    Overview   10/2/6765-[id number]-[id number] for failure to progress.  39 weeks 2 days.  Baby boy named Stanley.  Repeat 2025 at 7:30 AM         History of abnormal cervical Pap smear    Overview   2023-ASCUS, HPV negative.  Repeat 1 year  2025- Neg         Prenatal care, antepartum    Overview   Declines genetic testing  Declines AFP          Other Visit Diagnoses          Prenatal care in second trimester    -  Primary    Relevant Orders    POC Urinalysis Dipstick (Completed)    US Ob 14 + Weeks Single or First Gestation      Encounter for fetal anatomic survey        Relevant Orders    US Ob 14 + Weeks Single or First Gestation            Pregnancy at 16w2d  Fetal status reassuring.   Counseled on MSAFP alone in relation to OTD and placental issues. Declined.   Anatomy scan next visit.   Activity and Exercise discussed.  Patient is on Prenatal vitamins  Discussed/encouraged Flu vaccination  Discussed/encouraged social distancing/COVID19 precautions; encouraged vaccination when able  AFP education included in patient instructions.   Return in about 1 month (around 4/30/2025) for ABRAM gilmore/Masha, Ultrasound- anatomy.    Kamilla Jay, APRN  03/31/2025

## 2025-05-01 ENCOUNTER — ROUTINE PRENATAL (OUTPATIENT)
Dept: OBSTETRICS AND GYNECOLOGY | Facility: CLINIC | Age: 25
End: 2025-05-01
Payer: COMMERCIAL

## 2025-05-01 VITALS — WEIGHT: 160 LBS | SYSTOLIC BLOOD PRESSURE: 120 MMHG | DIASTOLIC BLOOD PRESSURE: 70 MMHG | BODY MASS INDEX: 25.06 KG/M2

## 2025-05-01 DIAGNOSIS — Z34.90 PRENATAL CARE, ANTEPARTUM, UNSPECIFIED GRAVIDITY: Primary | ICD-10-CM

## 2025-05-01 DIAGNOSIS — F32.A ANXIETY AND DEPRESSION: ICD-10-CM

## 2025-05-01 DIAGNOSIS — F41.9 ANXIETY AND DEPRESSION: ICD-10-CM

## 2025-05-01 LAB
EXPIRATION DATE: 0
GLUCOSE UR STRIP-MCNC: NEGATIVE MG/DL
Lab: 0
PROT UR STRIP-MCNC: NEGATIVE MG/DL

## 2025-05-01 NOTE — PROGRESS NOTES
OB FOLLOW UP  CC- Here for care of pregnancy        Elaina Pena is a 24 y.o.  20w5d patient being seen today for her obstetrical follow up visit. Patient reports no complaints.     Her prenatal care is complicated by (and status) : see below.  Patient Active Problem List   Diagnosis    History of abnormal cervical Pap smear    Delivery of pregnancy by  section    Prenatal care, antepartum    Anxiety and depression       Flu Status: Declines  US done today: Yes.  Findings showed   Male fetus in breech presentation fetal heart rate of 151.  Anterior placenta and three-vessel cord noted.  Normal fluid volume.  Estimated fetal weight 13 ounces which is 43rd percentile in size being consistent with dates.  No fetal anomaly seen, however unable to adequately visualize kidneys, spine views.  cervical length normal at 34 mm..  I have personally evaluated the U/S and agree with the findings. Anita Flanagan MD    AFP was declined.    ROS -     Patient Denies: leaking of fluid, vaginal bleeding, dysuria, excessive vomiting, and more than 6 contractions per hour  Fetal Movement : Yes  Other than what is documented in the HPI, all other systems reviewed and are negative.       The additional following portions of the patient's history were reviewed and updated as appropriate: allergies, current medications, and problem list.      I have reviewed and agree with the HPI, ROS, and historical information as entered above. Anita Flanagan MD      /70   Wt 72.6 kg (160 lb)   LMP 2024   BMI 25.06 kg/m²       EXAM:     Prenatal Vitals  BP: 120/70  Weight: 72.6 kg (160 lb)   Fetal Heart Rate: usg          Urine Glucose Read-only: Negative  Urine Protein Read-only: Negative       Assessment and Plan    Problem List Items Addressed This Visit          Gravid and     Delivery of pregnancy by  section    Overview   10/2/1151-G-[id number] for failure to progress.  39  weeks 2 days.  Baby boy named Stanley.  Repeat 9/8/2025 at 7:30 AM         Prenatal care, antepartum - Primary    Overview   Declines genetic testing  Declines AFP         Relevant Orders    POC Protein, Urine, Qualitative, Dipstick (Completed)    POC Glucose, Urine, Qualitative, Dipstick (Completed)    US Ob Follow Up Transabdominal Approach       Mental Health    Anxiety and depression    Overview   Pt taking lexapro prescribed by PCP since around Aug 2024. PT states she feels good on it and is currently doing okay with 5mg.          Relevant Medications    escitalopram (LEXAPRO) 10 MG tablet    doxylamine (UNISOM) 25 MG tablet       Pregnancy at 20w5d  Anatomy scan today is incomplete, follow up in 4 weeks for additional views. Anatomy that was visualized was within normal limits.  Fetal status reassuring.   Activity and Exercise discussed.  Patient is on Prenatal vitamins  Return in about 4 weeks (around 5/29/2025) for ultrasound.      Anita Flanagan MD  05/01/2025

## 2025-05-29 ENCOUNTER — ROUTINE PRENATAL (OUTPATIENT)
Dept: OBSTETRICS AND GYNECOLOGY | Facility: CLINIC | Age: 25
End: 2025-05-29
Payer: COMMERCIAL

## 2025-05-29 VITALS — WEIGHT: 167 LBS | DIASTOLIC BLOOD PRESSURE: 66 MMHG | BODY MASS INDEX: 26.16 KG/M2 | SYSTOLIC BLOOD PRESSURE: 112 MMHG

## 2025-05-29 DIAGNOSIS — F12.10 TETRAHYDROCANNABINOL (THC) USE DISORDER, MILD, ABUSE: ICD-10-CM

## 2025-05-29 DIAGNOSIS — F32.A ANXIETY AND DEPRESSION: ICD-10-CM

## 2025-05-29 DIAGNOSIS — F41.9 ANXIETY AND DEPRESSION: ICD-10-CM

## 2025-05-29 DIAGNOSIS — Z34.80 PRENATAL CARE, SUBSEQUENT PREGNANCY, ANTEPARTUM: Primary | ICD-10-CM

## 2025-05-29 LAB
EXPIRATION DATE: 0
GLUCOSE UR STRIP-MCNC: NEGATIVE MG/DL
Lab: 0
PROT UR STRIP-MCNC: NEGATIVE MG/DL

## 2025-05-29 NOTE — PROGRESS NOTES
OB FOLLOW UP  CC- Here for care of pregnancy        Elaina Pena is a 24 y.o.  24w5d patient being seen today for her obstetrical follow up visit. Patient reports no complaints.     Her prenatal care is complicated by (and status) : see below.  Patient Active Problem List   Diagnosis    History of abnormal cervical Pap smear    Delivery of pregnancy by  section    Prenatal care, antepartum    Anxiety and depression    Tetrahydrocannabinol (THC) use disorder, mild, abuse       Flu Status: Declines  Ultrasound Today: Yes US done today: Yes.  Findings showed   Male fetus in breech presentation with fetal heart rate of 155.  Anterior placenta and three-vessel cord noted.  Normal fluid volume.  Estimated fetal weight 1 pound 9 ounces which is 32nd percentile.  Size is consistent with dates.  Anatomy scan now complete and within normal limits..  I have personally evaluated the U/S and agree with the findings. Anita Flanagan MD   Reviewed 1 hr glucose testing and TDAP next visit.    ROS -   Patient Denies: leaking of fluid, vaginal bleeding, dysuria, excessive vomiting, and more than 6 contractions per hour  Fetal Movement : normal  Other than what is documented in the HPI, all other systems reviewed and are negative.       The additional following portions of the patient's history were reviewed and updated as appropriate: allergies, current medications, and problem list.      I have reviewed and agree with the HPI, ROS, and historical information as entered above. Anita Flanagan MD      /66   Wt 75.8 kg (167 lb)   LMP 2024   BMI 26.16 kg/m²       EXAM:     Prenatal Vitals  BP: 112/66  Weight: 75.8 kg (167 lb)   Fetal Heart Rate: usg               Urine Glucose Read-only: Negative  Urine Protein Read-only: Negative       Assessment and Plan    Problem List Items Addressed This Visit          Gravid and     Delivery of pregnancy by  section     Overview   10/2/5375-H-[id number] for failure to progress.  39 weeks 2 days.  Baby boy named Stanley.  Repeat 2025 at 7:30 AM         Prenatal care, antepartum - Primary    Overview   Declines genetic testing  Declines AFP         Relevant Orders    POC Protein, Urine, Qualitative, Dipstick (Completed)    POC Glucose, Urine, Qualitative, Dipstick (Completed)       Mental Health    Anxiety and depression    Overview   Pt taking lexapro prescribed by PCP since around Aug 2024. PT states she feels good on it and is currently doing okay with 5mg.          Relevant Medications    escitalopram (LEXAPRO) 10 MG tablet    doxylamine (UNISOM) 25 MG tablet    Tetrahydrocannabinol (THC) use disorder, mild, abuse    Overview   25-positive at new OB.  Will check at 28 weeks         Relevant Medications    escitalopram (LEXAPRO) 10 MG tablet    doxylamine (UNISOM) 25 MG tablet       Pregnancy at 24w5d  Fetal status reassuring.  anatomy scan completed today and within normal limits.  1 hour gtt, CBC, Antibody screen, TDAP, and RPR next visit. Instructions given  Discussed/encouraged TDAP vaccination after 28 weeks  Activity and Exercise discussed.  Return in about 4 weeks (around 2025) for glucola.      Anita Flanagan MD  2025

## 2025-06-26 ENCOUNTER — ROUTINE PRENATAL (OUTPATIENT)
Dept: OBSTETRICS AND GYNECOLOGY | Facility: CLINIC | Age: 25
End: 2025-06-26
Payer: COMMERCIAL

## 2025-06-26 VITALS — BODY MASS INDEX: 28.22 KG/M2 | DIASTOLIC BLOOD PRESSURE: 64 MMHG | WEIGHT: 180.2 LBS | SYSTOLIC BLOOD PRESSURE: 112 MMHG

## 2025-06-26 DIAGNOSIS — F41.9 ANXIETY AND DEPRESSION: ICD-10-CM

## 2025-06-26 DIAGNOSIS — F32.A ANXIETY AND DEPRESSION: ICD-10-CM

## 2025-06-26 DIAGNOSIS — F12.10 TETRAHYDROCANNABINOL (THC) USE DISORDER, MILD, ABUSE: ICD-10-CM

## 2025-06-26 DIAGNOSIS — Z34.80 PRENATAL CARE, SUBSEQUENT PREGNANCY, ANTEPARTUM: Primary | ICD-10-CM

## 2025-06-26 LAB
GLUCOSE UR STRIP-MCNC: NEGATIVE MG/DL
PROT UR STRIP-MCNC: NEGATIVE MG/DL

## 2025-06-26 NOTE — PROGRESS NOTES
OB FOLLOW UP  CC- Here for care of pregnancy        Elaina Pena is a 24 y.o.  28w5d patient being seen today for her obstetrical follow up. Patient reports no complaints.     Repeat UDS today for THC use.     Patient undergoing Glucola testing today. She is due for her testing at 9:40.       MBT: O+  Rhogam: is not indicated.  28 week packet: reviewed with patient , counseled on fetal movement , pediatrician list reviewed, breast pump discussed, and childbirth classes reviewed  TDAP: given today  Ultrasound Today: No  Does patient need tubal consent or  consent signed? no    Her prenatal care is complicated by (and status) : see below.  Patient Active Problem List   Diagnosis    History of abnormal cervical Pap smear    Delivery of pregnancy by  section    Prenatal care, antepartum    Anxiety and depression    Tetrahydrocannabinol (THC) use disorder, mild, abuse         ROS -   Patient Denies: Loss of Fluid, Vaginal Spotting, Vision Changes, Headaches, Nausea , Vomiting , Contractions, Epigastric pain, and skin itching  Fetal Movement : normal  Other than what is documented in the HPI, all other systems reviewed and are negative.     The additional following portions of the patient's history were reviewed and updated as appropriate: allergies and current medications.    I have reviewed and agree with the HPI, ROS, and historical information as entered above. Anita Flanagan MD      /64   Wt 81.7 kg (180 lb 3.2 oz)   LMP 2024   BMI 28.22 kg/m²         EXAM:     Prenatal Vitals  BP: 112/64  Weight: 81.7 kg (180 lb 3.2 oz)   Fetal Heart Rate: 148      Fundal Height (cm): 28 cm        Urine Glucose Read-only: Negative  Urine Protein Read-only: Negative         Assessment and Plan    Problem List Items Addressed This Visit          Gravid and     Delivery of pregnancy by  section    Overview   10/2/3677-B-fffwlby for failure to progress.  39 weeks  2 days.  Baby boy named Stanley.  Repeat 9/8/2025 at 7:30 AM         Prenatal care, antepartum - Primary    Overview   Declines genetic testing  Declines AFP         Relevant Orders    POC Urinalysis Dipstick (Completed)    Tdap Vaccine Greater Than or Equal To 6yo IM (Completed)    CBC (No Diff)    Gestational Screen 1 Hr ([facility])    Antibody Screen    RPR, Rfx Qn RPR / Confirm TP       Mental Health    Anxiety and depression    Overview   Pt taking lexapro prescribed by PCP since around Aug 2024. PT states she feels good on it and is currently doing okay with 5mg.          Relevant Medications    escitalopram (LEXAPRO) 10 MG tablet    doxylamine (UNISOM) 25 MG tablet    Tetrahydrocannabinol (THC) use disorder, mild, abuse    Overview   5/29/25-positive at new OB.  Will check at 28 weeks         Relevant Medications    escitalopram (LEXAPRO) 10 MG tablet    doxylamine (UNISOM) 25 MG tablet    Other Relevant Orders    US Ob Follow Up Transabdominal Approach    Urine Drug Screen - Urine, Clean Catch       Pregnancy at 28w5d  1 hr Glucola, CBC, RPR. Antibody screen and TDAP today  Fetal movement/PTL or Labor precautions  Activity and Exercise discussed.  Return in about 4 weeks (around 7/24/2025) for ultrasound.        Anita Flanagan MD  06/26/2025

## 2025-06-27 LAB
AMPHETAMINES UR QL SCN: NEGATIVE NG/ML
BARBITURATES UR QL SCN: NEGATIVE NG/ML
BENZODIAZ UR QL SCN: NEGATIVE NG/ML
BLD GP AB SCN SERPL QL: NEGATIVE
BZE UR QL SCN: NEGATIVE NG/ML
CANNABINOIDS UR QL SCN: POSITIVE NG/ML
CREAT UR-MCNC: 100 MG/DL (ref 20–300)
ERYTHROCYTE [DISTWIDTH] IN BLOOD BY AUTOMATED COUNT: 11.9 % (ref 12.3–15.4)
GLUCOSE 1H P 50 G GLC PO SERPL-MCNC: 56 MG/DL (ref 65–139)
HCT VFR BLD AUTO: 39.5 % (ref 34–46.6)
HGB BLD-MCNC: 12 G/DL (ref 12–15.9)
LABORATORY COMMENT REPORT: ABNORMAL
MCH RBC QN AUTO: 25.9 PG (ref 26.6–33)
MCHC RBC AUTO-ENTMCNC: 30.4 G/DL (ref 31.5–35.7)
MCV RBC AUTO: 85.3 FL (ref 79–97)
METHADONE UR QL SCN: NEGATIVE NG/ML
OBSERVATION IMP: NORMAL
OPIATES UR QL SCN: NEGATIVE NG/ML
OXYCODONE+OXYMORPHONE UR QL SCN: NEGATIVE NG/ML
PCP UR QL: NEGATIVE NG/ML
PH UR: 5.9 [PH] (ref 4.5–8.9)
PLATELET # BLD AUTO: 196 10*3/MM3 (ref 140–450)
PROPOXYPH UR QL SCN: NEGATIVE NG/ML
RBC # BLD AUTO: 4.63 10*6/MM3 (ref 3.77–5.28)
RPR SER QL: NON REACTIVE
WBC # BLD AUTO: 9 10*3/MM3 (ref 3.4–10.8)

## 2025-07-24 ENCOUNTER — ROUTINE PRENATAL (OUTPATIENT)
Dept: OBSTETRICS AND GYNECOLOGY | Facility: CLINIC | Age: 25
End: 2025-07-24
Payer: COMMERCIAL

## 2025-07-24 VITALS — SYSTOLIC BLOOD PRESSURE: 110 MMHG | DIASTOLIC BLOOD PRESSURE: 70 MMHG | WEIGHT: 182 LBS | BODY MASS INDEX: 28.51 KG/M2

## 2025-07-24 DIAGNOSIS — F32.A ANXIETY AND DEPRESSION: Primary | ICD-10-CM

## 2025-07-24 DIAGNOSIS — Z98.891 HISTORY OF CESAREAN DELIVERY: ICD-10-CM

## 2025-07-24 DIAGNOSIS — Z34.80 PRENATAL CARE, SUBSEQUENT PREGNANCY, ANTEPARTUM: ICD-10-CM

## 2025-07-24 DIAGNOSIS — F41.9 ANXIETY AND DEPRESSION: Primary | ICD-10-CM

## 2025-07-24 DIAGNOSIS — F12.10 TETRAHYDROCANNABINOL (THC) USE DISORDER, MILD, ABUSE: ICD-10-CM

## 2025-07-24 LAB
GLUCOSE UR STRIP-MCNC: NEGATIVE MG/DL
PROT UR STRIP-MCNC: NEGATIVE MG/DL

## 2025-07-24 NOTE — PROGRESS NOTES
OB FOLLOW UP  CC- Here for care of pregnancy        Elaina Pena is a 24 y.o.  32w5d patient being seen today for her obstetrical follow up visit. Patient reports occasional nausea and vomiting.     Pt states she is working on quitting [employer] (+UDS on )    Her prenatal care is complicated by (and status) :  see below.  Patient Active Problem List   Diagnosis    History of abnormal cervical Pap smear    History of  delivery    Prenatal care, antepartum    Anxiety and depression    Tetrahydrocannabinol (THC) use disorder, mild, abuse         TDAP status: received at last visit  Rhogam status: was not indicated  28 week labs: Reviewed and normal  US done today: Yes.  Findings showed   Male fetus in cephalic presentation with fetal heart rate of 149.  Anterior placenta and normal fluid volume noted.  Estimated fetal weight 4 pounds 10 ounces which is 47th percentile..  I have personally evaluated the U/S and agree with the findings. Anita Flanagan MD    Non Stress Test: No.      ROS -   Patient Denies: Loss of Fluid, Vaginal Spotting, Vision Changes, Headaches, Contractions, Epigastric pain, and skin itching  Fetal Movement : normal  Other than what is documented in the HPI, all other systems reviewed and are negative.     The additional following portions of the patient's history were reviewed and updated as appropriate: allergies and current medications.    I have reviewed and agree with the HPI, ROS, and historical information as entered above. Anita Flanagan MD      /70   Wt 82.6 kg (182 lb)   LMP 2024   BMI 28.51 kg/m²         EXAM:     Prenatal Vitals  BP: 110/70  Weight: 82.6 kg (182 lb)   Fetal Heart Rate: 149               Urine Glucose Read-only: Negative  Urine Protein Read-only: Negative           Assessment and Plan    Problem List Items Addressed This Visit          Gravid and     History of  delivery    Overview    10/2/5350-D-[id number] for failure to progress.  39 weeks 2 days.  Baby boy named Stanley.  Repeat 2025 at 7:30 AM         Prenatal care, antepartum    Overview   Declines genetic testing  Declines AFP    At 32+5 weeks-  Male fetus in cephalic presentation with fetal heart rate of 149.  Anterior placenta and normal fluid volume noted.  Estimated fetal weight 4 pounds 10 ounces which is 47th percentile.         Relevant Orders    POC Urinalysis Dipstick (Completed)       Mental Health    Anxiety and depression - Primary    Overview   Pt taking lexapro prescribed by PCP since around Aug 2024. PT states she feels good on it and is currently doing okay with 5mg.          Relevant Medications    escitalopram (LEXAPRO) 10 MG tablet    doxylamine (UNISOM) 25 MG tablet    Tetrahydrocannabinol (THC) use disorder, mild, abuse    Overview   25-positive at new OB.  Will check at 28 weeks         Relevant Medications    escitalopram (LEXAPRO) 10 MG tablet    doxylamine (UNISOM) 25 MG tablet       Pregnancy at 32w5d  Fetal status reassuring.  28 week labs reviewed.    Activity and Exercise discussed.  Fetal movement/PTL or Labor precautions  Patient is on Prenatal vitamins  Return in about 2 weeks (around 2025).    Anita Flanagan MD  2025

## 2025-08-07 ENCOUNTER — ROUTINE PRENATAL (OUTPATIENT)
Dept: OBSTETRICS AND GYNECOLOGY | Facility: CLINIC | Age: 25
End: 2025-08-07
Payer: COMMERCIAL

## 2025-08-07 VITALS — DIASTOLIC BLOOD PRESSURE: 76 MMHG | WEIGHT: 188 LBS | SYSTOLIC BLOOD PRESSURE: 120 MMHG | BODY MASS INDEX: 29.44 KG/M2

## 2025-08-07 DIAGNOSIS — F32.A ANXIETY AND DEPRESSION: ICD-10-CM

## 2025-08-07 DIAGNOSIS — F12.10 TETRAHYDROCANNABINOL (THC) USE DISORDER, MILD, ABUSE: ICD-10-CM

## 2025-08-07 DIAGNOSIS — Z98.891 HISTORY OF CESAREAN DELIVERY: Primary | ICD-10-CM

## 2025-08-07 DIAGNOSIS — F41.9 ANXIETY AND DEPRESSION: ICD-10-CM

## 2025-08-07 DIAGNOSIS — Z34.80 PRENATAL CARE, SUBSEQUENT PREGNANCY, ANTEPARTUM: ICD-10-CM

## 2025-08-07 LAB
GLUCOSE UR STRIP-MCNC: NEGATIVE MG/DL
PROT UR STRIP-MCNC: NEGATIVE MG/DL

## 2025-08-20 ENCOUNTER — HOSPITAL ENCOUNTER (EMERGENCY)
Facility: HOSPITAL | Age: 25
Discharge: HOME | End: 2025-08-21
Attending: OBSTETRICS & GYNECOLOGY | Admitting: OBSTETRICS & GYNECOLOGY
Payer: COMMERCIAL

## 2025-08-20 ENCOUNTER — TELEPHONE (OUTPATIENT)
Dept: OBSTETRICS AND GYNECOLOGY | Facility: CLINIC | Age: 25
End: 2025-08-20
Payer: COMMERCIAL

## 2025-08-21 ENCOUNTER — TELEPHONE (OUTPATIENT)
Dept: OBSTETRICS AND GYNECOLOGY | Facility: CLINIC | Age: 25
End: 2025-08-21

## 2025-08-21 PROBLEM — O47.00 PRETERM UTERINE CONTRACTIONS, ANTEPARTUM: Status: ACTIVE | Noted: 2025-08-21

## 2025-08-21 PROBLEM — O21.9 NAUSEA AND VOMITING DURING PREGNANCY: Status: ACTIVE | Noted: 2025-08-21

## 2025-08-28 ENCOUNTER — LAB (OUTPATIENT)
Dept: LAB | Facility: HOSPITAL | Age: 25
End: 2025-08-28
Payer: COMMERCIAL

## 2025-08-28 ENCOUNTER — PREP FOR SURGERY (OUTPATIENT)
Dept: OTHER | Facility: HOSPITAL | Age: 25
End: 2025-08-28
Payer: COMMERCIAL

## 2025-08-28 ENCOUNTER — ROUTINE PRENATAL (OUTPATIENT)
Dept: OBSTETRICS AND GYNECOLOGY | Facility: CLINIC | Age: 25
End: 2025-08-28
Payer: COMMERCIAL

## 2025-08-28 VITALS — BODY MASS INDEX: 41.55 KG/M2 | WEIGHT: 192 LBS | DIASTOLIC BLOOD PRESSURE: 84 MMHG | SYSTOLIC BLOOD PRESSURE: 120 MMHG

## 2025-08-28 DIAGNOSIS — F41.9 ANXIETY AND DEPRESSION: ICD-10-CM

## 2025-08-28 DIAGNOSIS — Z34.80 PRENATAL CARE, SUBSEQUENT PREGNANCY, ANTEPARTUM: Primary | ICD-10-CM

## 2025-08-28 DIAGNOSIS — F12.10 TETRAHYDROCANNABINOL (THC) USE DISORDER, MILD, ABUSE: ICD-10-CM

## 2025-08-28 DIAGNOSIS — F32.A ANXIETY AND DEPRESSION: ICD-10-CM

## 2025-08-28 DIAGNOSIS — O21.9 NAUSEA AND VOMITING DURING PREGNANCY: ICD-10-CM

## 2025-08-28 DIAGNOSIS — O47.00 PRETERM UTERINE CONTRACTIONS, ANTEPARTUM: ICD-10-CM

## 2025-08-28 DIAGNOSIS — Z98.891 HISTORY OF CESAREAN DELIVERY: Primary | ICD-10-CM

## 2025-08-28 PROBLEM — Z34.90 PREGNANCY: Status: ACTIVE | Noted: 2025-08-28

## 2025-08-28 LAB
GLUCOSE UR STRIP-MCNC: NEGATIVE MG/DL
PROT UR STRIP-MCNC: ABNORMAL MG/DL

## 2025-08-28 PROCEDURE — 87081 CULTURE SCREEN ONLY: CPT

## 2025-08-28 RX ORDER — CITRIC ACID/SODIUM CITRATE 334-500MG
30 SOLUTION, ORAL ORAL ONCE
OUTPATIENT
Start: 2025-08-28 | End: 2025-08-28

## 2025-08-28 RX ORDER — SODIUM CHLORIDE 0.9 % (FLUSH) 0.9 %
10 SYRINGE (ML) INJECTION AS NEEDED
OUTPATIENT
Start: 2025-08-28

## 2025-08-28 RX ORDER — ACETAMINOPHEN 500 MG
1000 TABLET ORAL ONCE
OUTPATIENT
Start: 2025-08-28 | End: 2025-08-28

## 2025-08-28 RX ORDER — METHYLERGONOVINE MALEATE 0.2 MG/ML
200 INJECTION INTRAVENOUS ONCE AS NEEDED
OUTPATIENT
Start: 2025-08-28

## 2025-08-28 RX ORDER — OXYTOCIN/0.9 % SODIUM CHLORIDE 30/500 ML
85 PLASTIC BAG, INJECTION (ML) INTRAVENOUS ONCE
OUTPATIENT
Start: 2025-08-28 | End: 2025-08-28

## 2025-08-28 RX ORDER — SODIUM CHLORIDE 9 MG/ML
40 INJECTION, SOLUTION INTRAVENOUS AS NEEDED
OUTPATIENT
Start: 2025-08-28

## 2025-08-28 RX ORDER — LIDOCAINE HYDROCHLORIDE 10 MG/ML
0.5 INJECTION, SOLUTION EPIDURAL; INFILTRATION; INTRACAUDAL; PERINEURAL ONCE AS NEEDED
OUTPATIENT
Start: 2025-08-28

## 2025-08-28 RX ORDER — BUPIVACAINE HCL/0.9 % NACL/PF 0.1 %
2 PLASTIC BAG, INJECTION (ML) EPIDURAL ONCE
OUTPATIENT
Start: 2025-08-28 | End: 2025-08-28

## 2025-08-28 RX ORDER — MISOPROSTOL 200 UG/1
800 TABLET ORAL AS NEEDED
OUTPATIENT
Start: 2025-08-28

## 2025-08-28 RX ORDER — OXYTOCIN/0.9 % SODIUM CHLORIDE 30/500 ML
650 PLASTIC BAG, INJECTION (ML) INTRAVENOUS ONCE
OUTPATIENT
Start: 2025-08-28 | End: 2025-08-28

## 2025-08-28 RX ORDER — CARBOPROST TROMETHAMINE 250 UG/ML
250 INJECTION, SOLUTION INTRAMUSCULAR AS NEEDED
OUTPATIENT
Start: 2025-08-28

## 2025-08-28 RX ORDER — SODIUM CHLORIDE 0.9 % (FLUSH) 0.9 %
10 SYRINGE (ML) INJECTION EVERY 12 HOURS SCHEDULED
OUTPATIENT
Start: 2025-08-28

## 2025-08-28 RX ORDER — SODIUM CHLORIDE, SODIUM LACTATE, POTASSIUM CHLORIDE, CALCIUM CHLORIDE 600; 310; 30; 20 MG/100ML; MG/100ML; MG/100ML; MG/100ML
125 INJECTION, SOLUTION INTRAVENOUS CONTINUOUS
OUTPATIENT
Start: 2025-08-28 | End: 2025-08-29

## 2025-08-28 RX ORDER — KETOROLAC TROMETHAMINE 30 MG/ML
30 INJECTION, SOLUTION INTRAMUSCULAR; INTRAVENOUS ONCE
OUTPATIENT
Start: 2025-08-28 | End: 2025-08-28

## 2025-08-31 LAB — BACTERIA SPEC AEROBE CULT: NORMAL

## (undated) DEVICE — SUT PDS 1 TP1 48IN Z880G BX/12

## (undated) DEVICE — SUT GUT CHRM 1 CTX 36IN 905H

## (undated) DEVICE — CLTH CLENS READYCLEANSE PERI CARE PK/5

## (undated) DEVICE — APPL CHLORAPREP TINTED 26ML TEAL

## (undated) DEVICE — SUT VIC 4/0 KS 27IN VCP662H

## (undated) DEVICE — ADHS SKIN PREMIERPRO EXOFIN TOPICAL HI/VISC .5ML

## (undated) DEVICE — 4-PORT MANIFOLD: Brand: NEPTUNE 2

## (undated) DEVICE — TRAP FLD MINIVAC MEGADYNE 100ML

## (undated) DEVICE — SOL IRR NACL 0.9PCT BT 1000ML

## (undated) DEVICE — TRY SPINE BLCK WHITACRE 25G 3X5IN

## (undated) DEVICE — COATED VICRYL  (POLYGLACTIN 910) SUTURE, VIOLET BRAIDED, STERILE, SYNTHETIC ABSORBABLE SUTURE: Brand: COATED VICRYL

## (undated) DEVICE — TBG PENCL TELESCP MEGADYNE SMOKE EVAC 10FT

## (undated) DEVICE — MAT PREVALON MOBL TRANSFR AIR WO/PAD 39X80IN

## (undated) DEVICE — PK C/SECT 10

## (undated) DEVICE — SOL IRR H2O BTL 1000ML STRL

## (undated) DEVICE — GLV SURG BIOGEL LTX PF 6 1/2

## (undated) DEVICE — SUT GUT CHRM 2/0 CT1 27IN 811H

## (undated) DEVICE — PATIENT RETURN ELECTRODE, SINGLE-USE, CONTACT QUALITY MONITORING, ADULT, WITH 9FT CORD, FOR PATIENTS WEIGING OVER 33LBS. (15KG): Brand: MEGADYNE